# Patient Record
Sex: FEMALE | Race: WHITE | Employment: FULL TIME | URBAN - METROPOLITAN AREA
[De-identification: names, ages, dates, MRNs, and addresses within clinical notes are randomized per-mention and may not be internally consistent; named-entity substitution may affect disease eponyms.]

---

## 2019-06-26 ENCOUNTER — HOSPITAL ENCOUNTER (EMERGENCY)
Age: 39
Discharge: HOME | End: 2019-06-26
Attending: EMERGENCY MEDICINE
Payer: COMMERCIAL

## 2019-06-26 VITALS
HEIGHT: 67 IN | HEART RATE: 86 BPM | DIASTOLIC BLOOD PRESSURE: 85 MMHG | SYSTOLIC BLOOD PRESSURE: 128 MMHG | TEMPERATURE: 98.9 F | OXYGEN SATURATION: 100 % | BODY MASS INDEX: 19.78 KG/M2 | RESPIRATION RATE: 14 BRPM | WEIGHT: 126 LBS

## 2019-06-26 DIAGNOSIS — S61.012A LACERATION OF LEFT THUMB WITHOUT FOREIGN BODY, NAIL DAMAGE STATUS UNSPECIFIED, INITIAL ENCOUNTER: Primary | ICD-10-CM

## 2019-06-26 PROCEDURE — 75810000293 HC SIMP/SUPERF WND  RPR

## 2019-06-26 PROCEDURE — 99282 EMERGENCY DEPT VISIT SF MDM: CPT

## 2019-06-26 PROCEDURE — 77030002986 HC SUT PROL J&J -A

## 2019-06-26 RX ORDER — PROPRANOLOL HYDROCHLORIDE 10 MG/1
TABLET ORAL 3 TIMES DAILY
COMMUNITY
End: 2020-04-30

## 2019-06-26 RX ORDER — DICYCLOMINE HYDROCHLORIDE 10 MG/1
10 CAPSULE ORAL
COMMUNITY
End: 2020-04-30

## 2019-06-26 RX ORDER — PROMETHAZINE HYDROCHLORIDE 12.5 MG/1
TABLET ORAL
COMMUNITY
End: 2020-04-30

## 2019-06-26 RX ORDER — TRAMADOL HYDROCHLORIDE 50 MG/1
50 TABLET ORAL
COMMUNITY
End: 2020-04-30

## 2019-06-26 NOTE — DISCHARGE INSTRUCTIONS

## 2019-06-26 NOTE — ED PROVIDER NOTES
EMERGENCY DEPARTMENT HISTORY AND PHYSICAL EXAM    Date: 6/26/2019  Patient Name: Liya Quintanilla    History of Presenting Illness     Chief Complaint   Patient presents with    Laceration         History Provided By: Patient      Liya Quintanilla is a 43 y.o. female who presents to the emergency department C/O left thumb laceration. Patient notes that she was cutting lettuce for lunch when the knife slipped and she cut her left thumb approximately 2 cm. Blood was controlled at the scene no other injuries patient has no other complaints at this time her tetanus is up-to-date. PCP: Torin Ladd MD        Past History     Past Medical History:  History reviewed. No pertinent past medical history. Past Surgical History:  History reviewed. No pertinent surgical history. Family History:  History reviewed. No pertinent family history. Social History:  Social History     Tobacco Use    Smoking status: Never Smoker    Smokeless tobacco: Never Used   Substance Use Topics    Alcohol use: Not Currently     Frequency: Never    Drug use: Not on file       Allergies: Allergies   Allergen Reactions    Latex Rash    Chlorhexidine Gluconate Anaphylaxis         Review of Systems   Review of Systems   All other systems reviewed and are negative. Physical Exam     Vitals:    06/26/19 0657   BP: 128/85   Pulse: 86   Resp: 14   Temp: 98.9 °F (37.2 °C)   SpO2: 100%   Weight: 57.2 kg (126 lb)   Height: 5' 7\" (1.702 m)     Physical Exam    Nursing notes and vital signs reviewed    Constitutional: Non toxic appearing, no acute distress  Head: Normocephalic, Atraumatic  Eyes: no exudate  Neck: Supple  Cardiovascular: Regular rate and rhythm  Chest: Normal work of breathing and chest excursion bilaterally  Abdomen: non distended  Back: No evidence of trauma or deformity  Extremities: 2 cm angled laceration of the left thumb. Bleeding controlled.   Skin: Warm and dry, normal cap refill  Neuro: Alert and appropriate  Psychiatric: Normal mood and affect      Diagnostic Study Results     Labs -   No results found for this or any previous visit (from the past 12 hour(s)). Radiologic Studies -   No orders to display     CT Results  (Last 48 hours)    None        CXR Results  (Last 48 hours)    None          Medications given in the ED-  Medications - No data to display      Medical Decision Making   I am the first provider for this patient. I reviewed the vital signs, available nursing notes, past medical history, past surgical history, family history and social history. Vital Signs-Reviewed the patient's vital signs. Records Reviewed: Nursing Notes    Provider Notes (Medical Decision Making): Nicola Acosta is a 43 y.o. female who presented today with a left thumb laceration. Verbal consent was obtained. The laceration was irrigated and sutured. Patient tolerated it well. Patient will be discharged home hemodynamically stable to follow-up with PCM in 7 days for suture removal.  Her questions were answered she was in agreement with the plan    Procedures:  Wound Repair  Date/Time: 6/26/2019 7:39 AM  Performed by: attendingPreparation: sterile field established  Pre-procedure re-eval: Immediately prior to the procedure, the patient was reevaluated and found suitable for the planned procedure and any planned medications. Time out: Immediately prior to the procedure a time out was called to verify the correct patient, procedure, equipment, staff and marking as appropriate. .  Location details: left hand  Wound length:2.5 cm or less  Anesthesia: digital block and local infiltration    Anesthesia:  Local Anesthetic: lidocaine 1% without epinephrine  Foreign bodies: no foreign bodies  Irrigation solution: saline  Debridement: none  Skin closure: Prolene  Technique: simple  Dressing: 4x4  Patient tolerance: Patient tolerated the procedure well with no immediate complications  My total time at bedside, performing this procedure was 1-15 minutes. Diagnosis and Disposition       DISCHARGE NOTE:    Leana Evans  results have been reviewed with her. She has been counseled regarding her diagnosis, treatment, and plan. She verbally conveys understanding and agreement of the signs, symptoms, diagnosis, treatment and prognosis and additionally agrees to follow up as discussed. She also agrees with the care-plan and conveys that all of her questions have been answered. I have also provided discharge instructions for her that include: educational information regarding their diagnosis and treatment, and list of reasons why they would want to return to the ED prior to their follow-up appointment, should her condition change. She has been provided with education for proper emergency department utilization. CLINICAL IMPRESSION:    1. Laceration of left thumb without foreign body, nail damage status unspecified, initial encounter        PLAN:  1. D/C Home  2. Current Discharge Medication List        3. Follow-up Information     Follow up With Specialties Details Why Contact Info    Jose Eduardo Hancock MD [facility] Schedule an appointment as soon as possible for a visit in 1 week For suture removal [address]  [phone]          _______________________________      Please note that this dictation was completed with [employer], the computer voice recognition software. Quite often unanticipated grammatical, syntax, homophones, and other interpretive errors are inadvertently transcribed by the computer software. Please disregard these errors. Please excuse any errors that have escaped final proofreading.

## 2019-06-26 NOTE — ED TRIAGE NOTES
Pt arrives ambulatory to ED with c\o lac to left thumb, pt is able to make needs known speaking in complete sentences, pt in nad at this time, bleeding controlled in triage

## 2020-04-29 PROCEDURE — 75810000293 HC SIMP/SUPERF WND  RPR

## 2020-04-29 PROCEDURE — 99285 EMERGENCY DEPT VISIT HI MDM: CPT

## 2020-04-30 ENCOUNTER — HOSPITAL ENCOUNTER (EMERGENCY)
Age: 40
Discharge: HOME | End: 2020-04-30
Attending: EMERGENCY MEDICINE
Payer: COMMERCIAL

## 2020-04-30 ENCOUNTER — APPOINTMENT (OUTPATIENT)
Dept: GENERAL RADIOLOGY | Age: 40
End: 2020-04-30
Attending: EMERGENCY MEDICINE
Payer: COMMERCIAL

## 2020-04-30 ENCOUNTER — HOSPITAL ENCOUNTER (EMERGENCY)
Dept: CT IMAGING | Age: 40
Discharge: HOME | End: 2020-04-30
Attending: EMERGENCY MEDICINE
Payer: COMMERCIAL

## 2020-04-30 VITALS
HEART RATE: 98 BPM | HEIGHT: 67 IN | DIASTOLIC BLOOD PRESSURE: 88 MMHG | BODY MASS INDEX: 20.72 KG/M2 | OXYGEN SATURATION: 100 % | SYSTOLIC BLOOD PRESSURE: 101 MMHG | RESPIRATION RATE: 20 BRPM | WEIGHT: 132 LBS | TEMPERATURE: 98.3 F

## 2020-04-30 DIAGNOSIS — S01.111A EYEBROW LACERATION, RIGHT, INITIAL ENCOUNTER: Primary | ICD-10-CM

## 2020-04-30 DIAGNOSIS — S43.004A SHOULDER SEPARATION, RIGHT, INITIAL ENCOUNTER: ICD-10-CM

## 2020-04-30 DIAGNOSIS — S09.90XA MINOR HEAD INJURY, INITIAL ENCOUNTER: ICD-10-CM

## 2020-04-30 PROCEDURE — 74011250637 HC RX REV CODE- 250/637: Performed by: EMERGENCY MEDICINE

## 2020-04-30 PROCEDURE — 70486 CT MAXILLOFACIAL W/O DYE: CPT

## 2020-04-30 PROCEDURE — 70450 CT HEAD/BRAIN W/O DYE: CPT

## 2020-04-30 PROCEDURE — 77030002986 HC SUT PROL J&J -A

## 2020-04-30 PROCEDURE — 74011250636 HC RX REV CODE- 250/636: Performed by: EMERGENCY MEDICINE

## 2020-04-30 PROCEDURE — 73030 X-RAY EXAM OF SHOULDER: CPT

## 2020-04-30 PROCEDURE — 71101 X-RAY EXAM UNILAT RIBS/CHEST: CPT

## 2020-04-30 PROCEDURE — 72125 CT NECK SPINE W/O DYE: CPT

## 2020-04-30 PROCEDURE — 74011000250 HC RX REV CODE- 250: Performed by: EMERGENCY MEDICINE

## 2020-04-30 RX ORDER — OXYCODONE AND ACETAMINOPHEN 5; 325 MG/1; MG/1
1 TABLET ORAL
Qty: 20 TAB | Refills: 0 | Status: SHIPPED | OUTPATIENT
Start: 2020-04-30 | End: 2020-05-07

## 2020-04-30 RX ORDER — OXYCODONE AND ACETAMINOPHEN 5; 325 MG/1; MG/1
1 TABLET ORAL
Status: COMPLETED | OUTPATIENT
Start: 2020-04-30 | End: 2020-04-30

## 2020-04-30 RX ORDER — LIDOCAINE HYDROCHLORIDE AND EPINEPHRINE 10; 10 MG/ML; UG/ML
10 INJECTION, SOLUTION INFILTRATION; PERINEURAL ONCE
Status: COMPLETED | OUTPATIENT
Start: 2020-04-30 | End: 2020-04-30

## 2020-04-30 RX ORDER — PROMETHAZINE HYDROCHLORIDE 25 MG/1
25 TABLET ORAL
Status: COMPLETED | OUTPATIENT
Start: 2020-04-30 | End: 2020-04-30

## 2020-04-30 RX ORDER — GABAPENTIN 600 MG/1
TABLET ORAL
COMMUNITY

## 2020-04-30 RX ORDER — HYDROMORPHONE HYDROCHLORIDE 2 MG/1
2 TABLET ORAL
Status: COMPLETED | OUTPATIENT
Start: 2020-04-30 | End: 2020-04-30

## 2020-04-30 RX ORDER — PROPRANOLOL HYDROCHLORIDE 120 MG/1
120 CAPSULE, EXTENDED RELEASE ORAL DAILY
COMMUNITY

## 2020-04-30 RX ORDER — HYDROMORPHONE HYDROCHLORIDE 2 MG/1
2 TABLET ORAL
COMMUNITY
End: 2020-04-30

## 2020-04-30 RX ORDER — PROMETHAZINE HYDROCHLORIDE 25 MG/1
TABLET ORAL
COMMUNITY
Start: 2017-04-25

## 2020-04-30 RX ORDER — TRAMADOL HYDROCHLORIDE 50 MG/1
1 TABLET ORAL
COMMUNITY
Start: 2007-12-11 | End: 2020-04-30

## 2020-04-30 RX ORDER — FOLIC ACID 1 MG/1
TABLET ORAL DAILY
COMMUNITY

## 2020-04-30 RX ORDER — METHOTREXATE 2.5 MG/1
7.5 TABLET ORAL EVERY OTHER DAY
COMMUNITY

## 2020-04-30 RX ORDER — FENTANYL CITRATE 50 UG/ML
50 INJECTION, SOLUTION INTRAMUSCULAR; INTRAVENOUS ONCE
Status: DISCONTINUED | OUTPATIENT
Start: 2020-04-30 | End: 2020-04-30

## 2020-04-30 RX ORDER — NALOXONE HYDROCHLORIDE 4 MG/.1ML
SPRAY NASAL AS NEEDED
COMMUNITY
Start: 2017-04-02

## 2020-04-30 RX ORDER — ACETAMINOPHEN 325 MG/1
650 TABLET ORAL DAILY
COMMUNITY

## 2020-04-30 RX ORDER — DICYCLOMINE HYDROCHLORIDE 20 MG/1
20 TABLET ORAL 2 TIMES DAILY
COMMUNITY
Start: 2017-09-20

## 2020-04-30 RX ORDER — FENTANYL CITRATE 50 UG/ML
50 INJECTION, SOLUTION INTRAMUSCULAR; INTRAVENOUS
Status: COMPLETED | OUTPATIENT
Start: 2020-04-30 | End: 2020-04-30

## 2020-04-30 RX ORDER — CARISOPRODOL 350 MG/1
350 TABLET ORAL
Qty: 20 TAB | Refills: 0 | Status: SHIPPED | OUTPATIENT
Start: 2020-04-30

## 2020-04-30 RX ORDER — ONDANSETRON 4 MG/1
4 TABLET, ORALLY DISINTEGRATING ORAL
Status: COMPLETED | OUTPATIENT
Start: 2020-04-30 | End: 2020-04-30

## 2020-04-30 RX ORDER — GABAPENTIN 300 MG/1
300 CAPSULE ORAL DAILY
COMMUNITY

## 2020-04-30 RX ADMIN — PROMETHAZINE HYDROCHLORIDE 25 MG: 25 TABLET ORAL at 03:07

## 2020-04-30 RX ADMIN — FENTANYL CITRATE 50 MCG: 50 INJECTION, SOLUTION INTRAMUSCULAR; INTRAVENOUS at 01:05

## 2020-04-30 RX ADMIN — HYDROMORPHONE HYDROCHLORIDE 2 MG: 2 TABLET ORAL at 03:07

## 2020-04-30 RX ADMIN — ONDANSETRON 4 MG: 4 TABLET, ORALLY DISINTEGRATING ORAL at 01:06

## 2020-04-30 RX ADMIN — OXYCODONE HYDROCHLORIDE AND ACETAMINOPHEN 1 TABLET: 5; 325 TABLET ORAL at 01:06

## 2020-04-30 RX ADMIN — LIDOCAINE HYDROCHLORIDE,EPINEPHRINE BITARTRATE 100 MG: 10; .01 INJECTION, SOLUTION INFILTRATION; PERINEURAL at 04:24

## 2020-04-30 NOTE — ED NOTES
Shoulder immobilizer placed to right shoulder/arm. Pt raj well. Pink warm fingers able to move fingers. Pt states feels better. Dr. Wilfred Lewis has repaired forehead lac. Pt raj well. Pt discharged home. Reviewed d/c teaching with pt including reasons to return, f/u care, and home care. Pt verbalizes understanding. Paper copies of d/c teaching given. Scripts provided and instructed. Instructed regarding soma and percocet possible sedation no driving, operating machinery, or drinking etoh with use. Instructed regarding max dose of acet/tyl per day and amt in percocet. Pt verbalizes understanding. Ambulatory with steady gait.  has come to take her home.    Patient armband removed and shredded

## 2020-04-30 NOTE — DISCHARGE INSTRUCTIONS
Patient Education        Sutures out in 7 days  Patient Education        Learning About a Closed Head Injury  What is a closed head injury? A closed head injury happens when your head gets hit hard. The strong force of the blow causes your brain to shake in your skull. This movement can cause the brain to bruise, swell, or tear. Sometimes nerves or blood vessels also get damaged. This can cause bleeding in or around the brain. A concussion is a type of closed head injury. What are the symptoms? If you have a mild concussion, you may have a mild headache or feel \"not quite right. \" These symptoms are common. They usually go away over a few days to 4 weeks. But sometimes after a concussion, you feel like you can't function as well as before the injury. And you have new symptoms. This is called postconcussive syndrome. You may:  · Find it harder to solve problems, think, concentrate, or remember. · Have headaches. · Have changes in your sleep patterns, such as not being able to sleep or sleeping all the time. · Have changes in your personality. · Not be interested in your usual activities. · Feel angry or anxious without a clear reason. · Lose your sense of taste or smell. · Be dizzy, lightheaded, or unsteady. It may be hard to stand or walk. How is a closed head injury treated? Any person who may have a concussion needs to see a doctor. Some people have to stay in the hospital to be watched. Others can go home safely. If you go home, follow your doctor's instructions. He or she will tell you if you need someone to watch you closely for the next 24 hours or longer. Rest is the best treatment. Get plenty of sleep at night. And try to rest during the day. · Avoid activities that are physically or mentally demanding. These include housework, exercise, and schoolwork. And don't play video games, send text messages, or use the computer.  You may need to change your school or work schedule to be able to avoid these activities. · Ask your doctor when it's okay to drive, ride a bike, or operate machinery. · Take an over-the-counter pain medicine, such as acetaminophen (Tylenol), ibuprofen (Advil, Motrin), or naproxen (Aleve). Be safe with medicines. Read and follow all instructions on the label. · Check with your doctor before you use any other medicines for pain. · Do not drink alcohol or use illegal drugs. They can slow recovery. They can also increase your risk of getting a second head injury. Follow-up care is a key part of your treatment and safety. Be sure to make and go to all appointments, and call your doctor if you are having problems. It's also a good idea to know your test results and keep a list of the medicines you take. Where can you learn more? Go to http://conyExplorer.ionelly.info/  Enter E235 in the search box to learn more about \"Learning About a Closed Head Injury. \"  Current as of: November 19, 2019Content Version: 12.4  © 9493-1539 [employer]. Care instructions adapted under license by [employer] (which disclaims liability or warranty for this information). If you have questions about a medical condition or this instruction, always ask your healthcare professional. [address] any warranty or liability for your use of this information. Patient Education        Shoulder Dislocation: Rehab Exercises  Introduction  Here are some examples of exercises for you to try. The exercises may be suggested for a condition or for rehabilitation. Start each exercise slowly. Ease off the exercises if you start to have pain. You will be told when to start these exercises and which ones will work best for you. How to do the exercises  Shoulder flexion (lying down)   1. Lie on your back, holding a wand with your hands. Your palms should face down as you hold the wand. Place your hands slightly wider than your shoulders.   2. Keeping your elbows straight, slowly raise your arms over your head until you feel a stretch in your shoulders, upper back, and chest.  3. Hold 15 to 30 seconds. 4. Repeat 2 to 4 times. Shoulder blade squeeze   1. While standing with your arms at your sides, squeeze your shoulder blades together. Do not raise your shoulders as you are squeezing. 2. Hold for 6 seconds. 3. Repeat 8 to 12 times. Internal rotator strengthening exercise   1. Begin by tying a piece of elastic exercise material to a doorknob. 2. Stand or sit with your shoulder relaxed and your elbow bent 90 degrees (like the angle of the letter \"L\"). Your upper arm should rest comfortably against your side. Squeeze a rolled towel between your elbow and your body for comfort and to help keep your arm at your side. 3. Hold one end of the elastic band in the hand of the painful arm. 4. Rotate your forearm toward your body until it touches your belly. 5. Keep your elbow and upper arm firmly tucked against the towel roll or the side of your body during this movement. 6. Repeat 8 to 12 times. Isometric shoulder external rotation   1. Stand with your affected arm close to a wall. 2. Bend your arm up so your elbow is at a 90 degree angle (like the letter \"L\"), and turn your palm as if you are about to shake someone's hand. 3. Hold your forearm and elbow close to the wall. Press the back of your hand into the wall with moderate pressure. 4. Hold for a count of 6.  5. Repeat 8 to 12 times. Isometric shoulder abduction   1. Stand with your affected arm close to a wall. 2. Bend your arm up so your elbow is at a 90 degree angle (like the letter \"L\"), and turn your palm as if you are about to shake someone's hand. 3. Hold your forearm and elbow close to the wall. Press your elbow into the wall with moderate pressure. 4. Hold for a count of 6.  5. Repeat 8 to 12 times. Wall push-ups   1.  Stand against a wall with your feet about 12 to 24 inches away from the wall. If you feel any pain when you do this exercise, stand closer to the wall. 2. Place your hands on the wall slightly wider apart than your shoulders, and lean forward. 3. Gently lean your body toward the wall. Then push back to your starting position. Keep the motion smooth and controlled. 4. Repeat 8 to 12 times. Follow-up care is a key part of your treatment and safety. Be sure to make and go to all appointments, and call your doctor if you are having problems. It's also a good idea to know your test results and keep a list of the medicines you take. Where can you learn more? Go to http://cony-nelly.info/  Enter V550 in the search box to learn more about \"Shoulder Dislocation: Rehab Exercises. \"  Current as of: June 26, 2019Content Version: 12.4  © 1969-7866 [employer]. Care instructions adapted under license by [employer] (which disclaims liability or warranty for this information). If you have questions about a medical condition or this instruction, always ask your healthcare professional. [address] any warranty or liability for your use of this information. Patient Education        The Shoulder Joint: Anatomy Sketch    Current as of: June 26, 2019Content Version: 12.4  © 6857-8908 [employer]. Care instructions adapted under license by [employer] (which disclaims liability or warranty for this information). If you have questions about a medical condition or this instruction, always ask your healthcare professional. [address] any warranty or liability for your use of this information. Cuts on the Face Closed With Stitches: Care Instructions  Your Care Instructions  A cut on your face can be on your chin, cheek, nose, forehead, eyelid, lip, or ear. The doctor used stitches to close the cut. Using stitches helps the cut heal and reduces scarring.  The doctor may also have called in a specialist, such as a plastic surgeon, to close the cut. If the cut went deep and through the skin, the doctor may have put in two layers of stitches. The deeper layer brings the deep part of the cut together. These stitches will dissolve and don't need to be removed. The stitches in the upper layer are the ones you see on the cut. You will probably have a bandage. You will need to have the stitches removed, usually in 3 to 5 days. The doctor has checked you carefully, but problems can develop later. If you notice any problems or new symptoms, get medical treatment right away. Follow-up care is a key part of your treatment and safety. Be sure to make and go to all appointments, and call your doctor if you are having problems. It's also a good idea to know your test results and keep a list of the medicines you take. How can you care for yourself at home? · Keep the cut dry for the first 24 to 48 hours. After this, you can shower if your doctor okays it. Pat the cut dry. · Don't soak the cut, such as in a bathtub. Your doctor will tell you when it's safe to get the cut wet. · If your doctor told you how to care for your cut, follow your doctor's instructions. If you did not get instructions, follow this general advice:  ? After the first 24 to 48 hours, wash around the cut with clean water 2 times a day. Don't use hydrogen peroxide or alcohol, which can slow healing. ? You may cover the cut with a thin layer of petroleum jelly, such as Vaseline, and a nonstick bandage. ? Apply more petroleum jelly and replace the bandage as needed. · Avoid any activity that could cause your cut to reopen. · Do not remove the stitches on your own. Your doctor will tell you when to come back to have the stitches removed. · Be safe with medicines. Take pain medicines exactly as directed. ? If the doctor gave you a prescription medicine for pain, take it as prescribed.   ? If you are not taking a prescription pain medicine, ask your doctor if you can take an over-the-counter medicine. When should you call for help? Call your doctor now or seek immediate medical care if:    · You have new pain, or your pain gets worse.     · The skin near the cut is cold or pale or changes color.     · You have tingling, weakness, or numbness near the cut.     · The cut starts to bleed, and blood soaks through the bandage. Oozing small amounts of blood is normal.     · You have symptoms of infection, such as:  ? Increased pain, swelling, warmth, or redness around the cut.  ? Red streaks leading from the cut.  ? Pus draining from the cut.  ? A fever.    Watch closely for changes in your health, and be sure to contact your doctor if:    · You do not get better as expected. Where can you learn more? Go to http://cony-nelly.info/  Enter K864 in the search box to learn more about \"Cuts on the Face Closed With Stitches: Care Instructions. \"  Current as of: June 26, 2019Content Version: 12.4  © 9020-8145 [employer]. Care instructions adapted under license by [employer] (which disclaims liability or warranty for this information). If you have questions about a medical condition or this instruction, always ask your healthcare professional. [address] any warranty or liability for your use of this information.

## 2020-04-30 NOTE — ED TRIAGE NOTES
Pt presents to ED from home after accidentally falling down full flight of stairs injuring face and right shoulder. Pt thinks she fell face first at least partially down. Pt has 3 cm lac to right eyebrow, swelling and bruising to right cheek. 6-7cm abrasion to lateral, posterior right shoulder.   Pt is A&Ox4, resps E&U

## 2020-05-03 NOTE — ED PROVIDER NOTES
EMERGENCY DEPARTMENT HISTORY AND PHYSICAL EXAM    Date: 4/30/2020  Patient Name: Willian Patton    History of Presenting Illness     Chief Complaint   Patient presents with    Fall         History Provided By: Patient    Additional History (Context):   1:13 AM  Willian Patton is a 35 y.o. female with PMHX of Lupus, fibromyalgia, migraines who presents to the emergency department C/O fall and injury. She got up from bed to go to the restroom, turned by the steps and suffered a mechanical fall, striking her head, face and right shoulder. Uncertain but likely pos LOC though only for a few moments. No visual changes no CP or SOB, no N/V. She has a cut over the right eye , tetanus is UTD    Social History  Denies smoking, drinking or drugs    Family History  Negative       PCP: Pippa Lake MD    Current Outpatient Medications   Medication Sig Dispense Refill    naloxone (Narcan) 4 mg/actuation nasal spray as needed.  dicyclomine (BENTYL) 20 mg tablet Take 20 mg by mouth two (2) times a day.  promethazine (PHENERGAN) 25 mg tablet 1-2 TABS PO PRN      propranolol LA (Inderal LA) 120 mg SR capsule Take 120 mg by mouth daily.  gabapentin (NEURONTIN) 300 mg capsule Take 300 mg by mouth daily.  gabapentin (NEURONTIN) 600 mg tablet Take  by mouth nightly.  methotrexate (RHEUMATREX) 2.5 mg tablet Take 7.5 mg by mouth every other day. Sunday and Wednesday      folic acid (FOLVITE) 1 mg tablet Take  by mouth daily.  oxyCODONE-acetaminophen (Percocet) 5-325 mg per tablet Take 1 Tab by mouth every four (4) hours as needed for Pain for up to 7 days. Max Daily Amount: 6 Tabs. Take 1 tablet every 4-6 hours as needed for pain control. If you were instructed to try over the counter ibuprofen or tylenol, only take the percocet for pain not controlled with the over the counter medication.  20 Tab 0    carisoprodoL (Soma) 350 mg tablet Take 1 Tab by mouth every eight (8) hours as needed for Muscle Spasm(s). Max Daily Amount: 1,050 mg. 20 Tab 0    diphenhydramine HCl (BENADRYL ALLERGY PO) Take  by mouth as needed.  LACTOBACILLUS ACIDOPHILUS PO Take  by mouth daily.  acetaminophen (TYLENOL) 325 mg tablet Take 650 mg by mouth daily. Past History     Past Medical History:  Past Medical History:   Diagnosis Date    Fibromyalgia     Lupus ([address]     Migraines     Polyarthropathy        Past Surgical History:  Past Surgical History:   Procedure Laterality Date    HX CHOLECYSTECTOMY         Family History:  No family history on file. Social History:  Social History     Tobacco Use    Smoking status: Former Smoker    Smokeless tobacco: Never Used   Substance Use Topics    Alcohol use: Not Currently     Frequency: Never    Drug use: Never       Allergies: Allergies   Allergen Reactions    Latex Rash    Chlorhexidine Gluconate Anaphylaxis    Acrylic Acid Itching     Acrylic glue    Clindamycin Rash         Review of Systems   Review of Systems   Musculoskeletal: Positive for arthralgias and neck pain. Neurological: Positive for light-headedness and headaches. Negative for tremors, syncope, weakness and numbness. All other systems reviewed and are negative. Physical Exam     Vitals:    04/30/20 0330 04/30/20 0345 04/30/20 0400 04/30/20 0415   BP: 109/71 111/71 106/78 101/88   Pulse:       Resp:       Temp:       SpO2: 100% 100% 99% 100%   Weight:       Height:         Physical Exam  Vitals signs and nursing note reviewed. Constitutional:       General: She is not in acute distress. Appearance: She is well-developed. She is not diaphoretic. HENT:      Head: Normocephalic. Abrasion, right periorbital erythema and laceration present. No Vivas's sign. Right Ear: Tympanic membrane and external ear normal.      Left Ear: Tympanic membrane and external ear normal.      Mouth/Throat:      Pharynx: No oropharyngeal exudate.    Eyes:      General: No visual field deficit or scleral icterus. Conjunctiva/sclera: Conjunctivae normal.      Pupils: Pupils are equal, round, and reactive to light. Comments: No pallor   Neck:      Musculoskeletal: Normal range of motion and neck supple. Thyroid: No thyromegaly. Vascular: No JVD. Trachea: No tracheal deviation. Cardiovascular:      Rate and Rhythm: Normal rate and regular rhythm. Heart sounds: Normal heart sounds. Pulmonary:      Effort: Pulmonary effort is normal. No respiratory distress. Breath sounds: Normal breath sounds. No stridor. Abdominal:      General: Bowel sounds are normal. There is no distension. Palpations: Abdomen is soft. Tenderness: There is no abdominal tenderness. There is no guarding or rebound. Musculoskeletal:      Right shoulder: She exhibits decreased range of motion, tenderness, bony tenderness, swelling and pain. She exhibits no effusion, no crepitus, no deformity, no laceration, no spasm, normal pulse and normal strength. Arms:       Comments: Distal radial median and ulnar nerves are normal    No other soft tissue injuries   Lymphadenopathy:      Cervical: No cervical adenopathy. Skin:     General: Skin is warm and dry. Findings: Abrasion, bruising, ecchymosis, signs of injury and wound present. No erythema or rash. Comments: 5 cm lac thru subcutaneous tissue, the right supraorbital ridge  Abrasion to the right zygoma  [facility]   Neurological:      Mental Status: She is alert and oriented to person, place, and time. GCS: GCS eye subscore is 4. GCS verbal subscore is 5. GCS motor subscore is 6. Cranial Nerves: No cranial nerve deficit or dysarthria. Motor: Motor function is intact. Coordination: Coordination normal.      Deep Tendon Reflexes: Reflexes are normal and symmetric.  Reflexes normal.      Comments: Subtle sensory changes to the right V1 distribution, otherwise normal   Psychiatric: Attention and Perception: Attention normal.         Mood and Affect: Mood normal.         Speech: Speech normal.         Behavior: Behavior normal.         Thought Content: Thought content normal.         Judgment: Judgment normal.      Comments: Quite calm, even joking           Diagnostic Study Results     Labs -   No results found for this or any previous visit (from the past 12 hour(s)). Radiologic Studies -   XR SHOULDER RT AP/LAT MIN 2 V   Final Result   Impression:   --------------      Elevation of the clavicular end of the acromioclavicular joint relative to the   acromial end. Suspect a grade 2 acromioclavicular separation. No fracture lines are seen. XR RIBS RT W PA CXR MIN 3 V   Final Result   Impression:   --------------      No significant abnormalities. CT HEAD WO CONT   Final Result   IMPRESSION:      No acute intracranial abnormalities. CT SPINE CERV WO CONT   Final Result   IMPRESSION: Moderate C5-6 disc degenerative change with a prominent posterior   osteophyte and associated mild uncovertebral hypertrophy with mild to moderate   spinal canal and neuroforaminal narrowing. Straightening of the cervical spine curvature of uncertain significance. No fracture seen. CT MAXILLOFACIAL WO CONT   Final Result   Impression:   --------------      No acute osseous abnormality. Right periorbital soft tissue swelling with a lateral right periorbital   laceration.               CT Results  (Last 48 hours)    None        CXR Results  (Last 48 hours)    None          Medications given in the ED-  Medications   oxyCODONE-acetaminophen (PERCOCET) 5-325 mg per tablet 1 Tab (1 Tab Oral Given 4/30/20 0106)   ondansetron (ZOFRAN ODT) tablet 4 mg (4 mg Oral Given 4/30/20 0106)   lidocaine-EPINEPHrine (XYLOCAINE) 1 %-1:100,000 injection 100 mg (100 mg IntraDERMal Given by Provider 4/30/20 3466)   fentaNYL citrate (PF) injection 50 mcg (50 mcg IntraNASal Given 4/30/20 0105) HYDROmorphone (DILAUDID) tablet 2 mg (2 mg Oral Given 4/30/20 0307)   promethazine (PHENERGAN) tablet 25 mg (25 mg Oral Given 4/30/20 0307)         Medical Decision Making   I am the first provider for this patient. I reviewed the vital signs, available nursing notes, past medical history, past surgical history, family history and social history. Vital Signs-Reviewed the patient's vital signs. Pulse Oximetry Analysis - 100% on room air     Cardiac Monitor:  Rate: 94 bpm  Rhythm: sinus rhythm      Records Reviewed: NURSING NOTES AND PREVIOUS MEDICAL RECORDS    Provider Notes (Medical Decision Making):   Mechanical fall with head, face neck shoulder injuries. The rest of exam is normal despite lupus and fibromyalgia, though perhaps why pain was a bit tough to control. Wound cleaned and closed in layers. Shoulder immobilized, sutures out in 7 days    Procedures:  WOUND REPAIR  Date/Time: 4/30/2020 3:45 AM  Performed by: attendingPreparation: skin prepped with Shur-Clens  Pre-procedure re-eval: Immediately prior to the procedure, the patient was reevaluated and found suitable for the planned procedure and any planned medications. Time out: Immediately prior to the procedure a time out was called to verify the correct patient, procedure, equipment, staff and marking as appropriate. .  Location details: face  Wound length:2.6 - 7.5 cm  Anesthesia: local infiltration    Anesthesia:  Local Anesthetic: lidocaine 1% with epinephrine  Anesthetic total: 10 mL  Sedatives: fentanyl  Foreign bodies: no foreign bodies  Irrigation solution: saline  Irrigation method: jet lavage  Debridement: none  Skin closure: 6-0 nylon  Subcutaneous closure: Vicryl  Number of sutures: 12  Technique: simple and interrupted (vicryl was inverted figure 8)  Approximation: close  Dressing: antibiotic ointment  My total time at bedside, performing this procedure was 31-45 minutes. ED Course:   1:13 AM: Initial assessment performed. The patients presenting problems have been discussed, and they are in agreement with the care plan formulated and outlined with them. I have encouraged them to ask questions as they arise throughout their visit. Diagnosis and Disposition       DISCHARGE NOTE:  4:30 AM  Jennifer Floyd  results have been reviewed with her. She has been counseled regarding her diagnosis, treatment, and plan. She verbally conveys understanding and agreement of the signs, symptoms, diagnosis, treatment and prognosis and additionally agrees to follow up as discussed. She also agrees with the care-plan and conveys that all of her questions have been answered. I have also provided discharge instructions for her that include: educational information regarding their diagnosis and treatment, and list of reasons why they would want to return to the ED prior to their follow-up appointment, should her condition change. She has been provided with education for proper emergency department utilization. CLINICAL IMPRESSION:    1. Eyebrow laceration, right, initial encounter    2. Shoulder separation, right, initial encounter    3. Minor head injury, initial encounter        PLAN:  1. D/C Home  2. Discharge Medication List as of 4/30/2020  4:30 AM      START taking these medications    Details   oxyCODONE-acetaminophen (Percocet) 5-325 mg per tablet Take 1 Tab by mouth every four (4) hours as needed for Pain for up to 7 days. Max Daily Amount: 6 Tabs. Take 1 tablet every 4-6 hours as needed for pain control. If you were instructed to try over the counter ibuprofen or tylenol, only take the percocet  for pain not controlled with the over the counter medication. , Print, Disp-20 Tab, R-0      carisoprodoL (Soma) 350 mg tablet Take 1 Tab by mouth every eight (8) hours as needed for Muscle Spasm(s).  Max Daily Amount: 1,050 mg., Print, Disp-20 Tab, R-0         CONTINUE these medications which have NOT CHANGED    Details   naloxone (Narcan) 4 mg/actuation nasal spray as needed., Historical Med      dicyclomine (BENTYL) 20 mg tablet Take 20 mg by mouth two (2) times a day., Historical Med      promethazine (PHENERGAN) 25 mg tablet 1-2 TABS PO PRN, Historical Med      propranolol LA (Inderal LA) 120 mg SR capsule Take 120 mg by mouth daily. , Historical Med      gabapentin (NEURONTIN) 300 mg capsule Take 300 mg by mouth daily. , Historical Med      gabapentin (NEURONTIN) 600 mg tablet Take  by mouth nightly., Historical Med      methotrexate (RHEUMATREX) 2.5 mg tablet Take 7.5 mg by mouth every other day. Sunday and Wednesday, Historical Med      folic acid (FOLVITE) 1 mg tablet Take  by mouth daily. , Historical Med      diphenhydramine HCl (BENADRYL ALLERGY PO) Take  by mouth as needed., Historical Med      LACTOBACILLUS ACIDOPHILUS PO Take  by mouth daily. , Historical Med      acetaminophen (TYLENOL) 325 mg tablet Take 650 mg by mouth daily. , Historical Med         STOP taking these medications       traMADoL (Ultram) 50 mg tablet Comments:   Reason for Stopping:         HYDROmorphone (Dilaudid) 2 mg tablet Comments:   Reason for Stopping:             3.   Follow-up Information     Follow up With Specialties Details Why Contact Shilpa Jaramillo MD Orthopedic Surgery In 1 week  [address] [place] [address] Rd  [address]      THE [facility] EMERGENCY DEPT Emergency Medicine  As needed [address] [id number]  [phone]        _______________________________    This note was partially transcribed via voice recognition software. Although efforts have been made to catch any discrepancies, it may contain sound alike words, grammatical errors, or nonsensical words.

## 2021-09-30 ENCOUNTER — APPOINTMENT (OUTPATIENT)
Dept: CT IMAGING | Age: 41
End: 2021-09-30
Attending: EMERGENCY MEDICINE
Payer: COMMERCIAL

## 2021-09-30 ENCOUNTER — HOSPITAL ENCOUNTER (EMERGENCY)
Age: 41
Discharge: HOME | End: 2021-09-30
Attending: EMERGENCY MEDICINE
Payer: COMMERCIAL

## 2021-09-30 VITALS
TEMPERATURE: 98.6 F | DIASTOLIC BLOOD PRESSURE: 83 MMHG | SYSTOLIC BLOOD PRESSURE: 120 MMHG | BODY MASS INDEX: 19.93 KG/M2 | OXYGEN SATURATION: 100 % | HEIGHT: 67 IN | RESPIRATION RATE: 18 BRPM | HEART RATE: 65 BPM | WEIGHT: 127 LBS

## 2021-09-30 DIAGNOSIS — N30.00 ACUTE CYSTITIS WITHOUT HEMATURIA: ICD-10-CM

## 2021-09-30 DIAGNOSIS — R10.84 ABDOMINAL PAIN, GENERALIZED: Primary | ICD-10-CM

## 2021-09-30 LAB
ALBUMIN SERPL-MCNC: 3.8 G/DL (ref 3.4–5)
ALBUMIN/GLOB SERPL: 1 {RATIO} (ref 0.8–1.7)
ALP SERPL-CCNC: 106 U/L (ref 45–117)
ALT SERPL-CCNC: 25 U/L (ref 13–56)
ANION GAP SERPL CALC-SCNC: 3 MMOL/L (ref 3–18)
APPEARANCE UR: CLEAR
AST SERPL-CCNC: 19 U/L (ref 10–38)
BACTERIA URNS QL MICRO: ABNORMAL /HPF
BASOPHILS # BLD: 0 K/UL (ref 0–0.1)
BASOPHILS NFR BLD: 1 % (ref 0–2)
BILIRUB SERPL-MCNC: 0.4 MG/DL (ref 0.2–1)
BILIRUB UR QL: NEGATIVE
BUN SERPL-MCNC: 15 MG/DL (ref 7–18)
BUN/CREAT SERPL: 16 (ref 12–20)
CALCIUM SERPL-MCNC: 9 MG/DL (ref 8.5–10.1)
CHLORIDE SERPL-SCNC: 107 MMOL/L (ref 100–111)
CO2 SERPL-SCNC: 31 MMOL/L (ref 21–32)
COLOR UR: YELLOW
CREAT SERPL-MCNC: 0.93 MG/DL (ref 0.6–1.3)
DIFFERENTIAL METHOD BLD: ABNORMAL
EOSINOPHIL # BLD: 0.1 K/UL (ref 0–0.4)
EOSINOPHIL NFR BLD: 2 % (ref 0–5)
EPITH CASTS URNS QL MICRO: ABNORMAL /LPF (ref 0–5)
ERYTHROCYTE [DISTWIDTH] IN BLOOD BY AUTOMATED COUNT: 14 % (ref 11.6–14.5)
GLOBULIN SER CALC-MCNC: 3.9 G/DL (ref 2–4)
GLUCOSE SERPL-MCNC: 89 MG/DL (ref 74–99)
GLUCOSE UR STRIP.AUTO-MCNC: NEGATIVE MG/DL
HCG SERPL QL: NEGATIVE
HCT VFR BLD AUTO: 37.9 % (ref 35–45)
HGB BLD-MCNC: 12.1 G/DL (ref 12–16)
HGB UR QL STRIP: NEGATIVE
KETONES UR QL STRIP.AUTO: NEGATIVE MG/DL
LEUKOCYTE ESTERASE UR QL STRIP.AUTO: ABNORMAL
LIPASE SERPL-CCNC: 188 U/L (ref 73–393)
LYMPHOCYTES # BLD: 1.4 K/UL (ref 0.9–3.6)
LYMPHOCYTES NFR BLD: 21 % (ref 21–52)
MCH RBC QN AUTO: 32.5 PG (ref 24–34)
MCHC RBC AUTO-ENTMCNC: 31.9 G/DL (ref 31–37)
MCV RBC AUTO: 101.9 FL (ref 78–100)
MONOCYTES # BLD: 0.5 K/UL (ref 0.05–1.2)
MONOCYTES NFR BLD: 8 % (ref 3–10)
NEUTS SEG # BLD: 4.4 K/UL (ref 1.8–8)
NEUTS SEG NFR BLD: 68 % (ref 40–73)
NITRITE UR QL STRIP.AUTO: NEGATIVE
PH UR STRIP: >8.5 [PH] (ref 5–8)
PLATELET # BLD AUTO: 441 K/UL (ref 135–420)
PMV BLD AUTO: 9.8 FL (ref 9.2–11.8)
POTASSIUM SERPL-SCNC: 4.6 MMOL/L (ref 3.5–5.5)
PROT SERPL-MCNC: 7.7 G/DL (ref 6.4–8.2)
PROT UR STRIP-MCNC: NEGATIVE MG/DL
RBC # BLD AUTO: 3.72 M/UL (ref 4.2–5.3)
RBC #/AREA URNS HPF: ABNORMAL /HPF (ref 0–5)
SODIUM SERPL-SCNC: 141 MMOL/L (ref 136–145)
SP GR UR REFRACTOMETRY: 1.01 (ref 1–1.03)
UROBILINOGEN UR QL STRIP.AUTO: 0.2 EU/DL (ref 0.2–1)
WBC # BLD AUTO: 6.6 K/UL (ref 4.6–13.2)
WBC URNS QL MICRO: ABNORMAL /HPF (ref 0–5)

## 2021-09-30 PROCEDURE — 74011000636 HC RX REV CODE- 636: Performed by: EMERGENCY MEDICINE

## 2021-09-30 PROCEDURE — 96374 THER/PROPH/DIAG INJ IV PUSH: CPT

## 2021-09-30 PROCEDURE — 96375 TX/PRO/DX INJ NEW DRUG ADDON: CPT

## 2021-09-30 PROCEDURE — 74011250637 HC RX REV CODE- 250/637: Performed by: EMERGENCY MEDICINE

## 2021-09-30 PROCEDURE — 87086 URINE CULTURE/COLONY COUNT: CPT

## 2021-09-30 PROCEDURE — 84703 CHORIONIC GONADOTROPIN ASSAY: CPT

## 2021-09-30 PROCEDURE — 74177 CT ABD & PELVIS W/CONTRAST: CPT

## 2021-09-30 PROCEDURE — 83690 ASSAY OF LIPASE: CPT

## 2021-09-30 PROCEDURE — 96376 TX/PRO/DX INJ SAME DRUG ADON: CPT

## 2021-09-30 PROCEDURE — 85025 COMPLETE CBC W/AUTO DIFF WBC: CPT

## 2021-09-30 PROCEDURE — 80053 COMPREHEN METABOLIC PANEL: CPT

## 2021-09-30 PROCEDURE — 99285 EMERGENCY DEPT VISIT HI MDM: CPT

## 2021-09-30 PROCEDURE — 74011250636 HC RX REV CODE- 250/636: Performed by: EMERGENCY MEDICINE

## 2021-09-30 PROCEDURE — 81001 URINALYSIS AUTO W/SCOPE: CPT

## 2021-09-30 RX ORDER — FAMOTIDINE 10 MG/ML
20 INJECTION INTRAVENOUS
Status: COMPLETED | OUTPATIENT
Start: 2021-09-30 | End: 2021-09-30

## 2021-09-30 RX ORDER — ONDANSETRON 2 MG/ML
4 INJECTION INTRAMUSCULAR; INTRAVENOUS
Status: COMPLETED | OUTPATIENT
Start: 2021-09-30 | End: 2021-09-30

## 2021-09-30 RX ORDER — MORPHINE SULFATE 10 MG/ML
6 INJECTION, SOLUTION INTRAMUSCULAR; INTRAVENOUS
Status: COMPLETED | OUTPATIENT
Start: 2021-09-30 | End: 2021-09-30

## 2021-09-30 RX ORDER — FLUTICASONE PROPIONATE 50 MCG
2 SPRAY, SUSPENSION (ML) NASAL DAILY
Status: DISCONTINUED | OUTPATIENT
Start: 2021-09-30 | End: 2021-09-30 | Stop reason: HOSPADM

## 2021-09-30 RX ORDER — MORPHINE SULFATE 4 MG/ML
4 INJECTION INTRAVENOUS
Status: COMPLETED | OUTPATIENT
Start: 2021-09-30 | End: 2021-09-30

## 2021-09-30 RX ORDER — CEPHALEXIN 500 MG/1
500 CAPSULE ORAL 3 TIMES DAILY
Qty: 21 CAPSULE | Refills: 0 | Status: SHIPPED | OUTPATIENT
Start: 2021-09-30 | End: 2021-10-07

## 2021-09-30 RX ADMIN — MORPHINE SULFATE 6 MG: 10 INJECTION INTRAVENOUS at 08:31

## 2021-09-30 RX ADMIN — SODIUM CHLORIDE 1000 ML: 900 INJECTION, SOLUTION INTRAVENOUS at 08:31

## 2021-09-30 RX ADMIN — IOPAMIDOL 100 ML: 612 INJECTION, SOLUTION INTRAVENOUS at 09:57

## 2021-09-30 RX ADMIN — ONDANSETRON 4 MG: 2 INJECTION INTRAMUSCULAR; INTRAVENOUS at 08:31

## 2021-09-30 RX ADMIN — FAMOTIDINE 20 MG: 10 INJECTION INTRAVENOUS at 08:32

## 2021-09-30 RX ADMIN — MORPHINE SULFATE 4 MG: 4 INJECTION INTRAVENOUS at 10:57

## 2021-09-30 RX ADMIN — FLUTICASONE PROPIONATE 2 SPRAY: 50 SPRAY, METERED NASAL at 09:43

## 2021-09-30 NOTE — ED PROVIDER NOTES
EMERGENCY DEPARTMENT HISTORY AND PHYSICAL EXAM    Date: 9/30/2021  Patient Name: Jacoby Alba    History of Presenting Illness     Chief Complaint   Patient presents with    Abdominal Pain         History Provided By: Patient    8:02 AM  Jacoby Alba is a 42 y.o. female with PMHX of fibromyalgia, lupus, prior cholecystectomy, currently undergoing work-up with gastroenterology for suspected IBD who presents to the emergency department C/O abdominal pain, nausea, diarrhea. Patient reports that she has been struggling with symptoms since July but they became worse over the last few days. She reports everything she eats passes straight through her. She reports increased urine output. She reports the pain is primarily over the right side of her abdomen and radiates up into her chest and down into her right leg without clear relieving or exacerbating factors. Denies any fever, abnormal vaginal bleeding or discharge, other complaints. PCP: Jeovany Story MD    Current Facility-Administered Medications   Medication Dose Route Frequency Provider Last Rate Last Admin    fluticasone propionate (FLONASE) 50 mcg/actuation nasal spray 2 Spray  2 Spray Both Nostrils DAILY Rosa Brownlee MD   2 [place] at 09/30/21 1224     Current Outpatient Medications   Medication Sig Dispense Refill    cephALEXin (Keflex) 500 mg capsule Take 1 Capsule by mouth three (3) times daily for 7 days. 21 Capsule 0    diphenhydramine HCl (BENADRYL ALLERGY PO) Take  by mouth as needed.  LACTOBACILLUS ACIDOPHILUS PO Take  by mouth daily.  acetaminophen (TYLENOL) 325 mg tablet Take 650 mg by mouth daily.  naloxone (Narcan) 4 mg/actuation nasal spray as needed.  dicyclomine (BENTYL) 20 mg tablet Take 20 mg by mouth two (2) times a day.  promethazine (PHENERGAN) 25 mg tablet 1-2 TABS PO PRN      propranolol LA (Inderal LA) 120 mg SR capsule Take 120 mg by mouth daily.       gabapentin (NEURONTIN) 300 mg capsule Take 300 mg by mouth daily.  gabapentin (NEURONTIN) 600 mg tablet Take  by mouth nightly.  methotrexate (RHEUMATREX) 2.5 mg tablet Take 7.5 mg by mouth every other day. Sunday and Wednesday      folic acid (FOLVITE) 1 mg tablet Take  by mouth daily.  carisoprodoL (Soma) 350 mg tablet Take 1 Tab by mouth every eight (8) hours as needed for Muscle Spasm(s). Max Daily Amount: 1,050 mg. 20 Tab 0       Past History     Past Medical History:  Past Medical History:   Diagnosis Date    Fibromyalgia     Lupus ([address]     Migraines     Polyarthropathy     Psoriasis        Past Surgical History:  Past Surgical History:   Procedure Laterality Date    HX CHOLECYSTECTOMY         Family History:  History reviewed. No pertinent family history. Social History:  Social History     Tobacco Use    Smoking status: Former Smoker    Smokeless tobacco: Never Used   Vaping Use    Vaping Use: Every day    Substances: THC   Substance Use Topics    Alcohol use: Not Currently    Drug use: Yes     Types: Marijuana       Allergies: Allergies   Allergen Reactions    Latex Rash    Chlorhexidine Gluconate Anaphylaxis    Acrylic Acid Itching     Acrylic glue    Clindamycin Rash         Review of Systems   Review of Systems   Constitutional: Negative for fever. Cardiovascular: Positive for chest pain. Gastrointestinal: Positive for abdominal pain, diarrhea and nausea. Genitourinary: Negative for decreased urine volume. All other systems reviewed and are negative.         Physical Exam     Vitals:    09/30/21 0839 09/30/21 0923 09/30/21 1030 09/30/21 1100   BP: 124/77  107/70 121/74   Pulse: 73 64 (!) 58 65   Resp: 17 15 11 18   Temp:       SpO2: 100% 100% 100% 99%   Weight:       Height:         Physical Exam    Nursing notes and vital signs reviewed    Constitutional: Non toxic appearing, moderate distress  Head: Normocephalic, Atraumatic  Eyes: EOMI  Neck: Supple  Cardiovascular: Regular rate and rhythm, no murmurs, rubs, or gallops  Chest: Normal work of breathing and chest excursion bilaterally  Lungs: Clear to ausculation bilaterally  Abdomen: Soft, tender over the right side of the abdomen without rebound or guarding, distended  Back: No evidence of trauma or deformity  Extremities: No evidence of trauma or deformity  Skin: Warm and dry, normal cap refill  Neuro: Alert and appropriate  Psychiatric: Tearful and anxious mood and affect      Diagnostic Study Results     Labs -     Recent Results (from the past 12 hour(s))   CBC WITH AUTOMATED DIFF    Collection Time: 09/30/21  8:17 AM   Result Value Ref Range    WBC 6.6 4.6 - 13.2 K/uL    RBC 3.72 (L) 4.20 - 5.30 M/uL    HGB 12.1 12.0 - 16.0 g/dL    HCT 37.9 35.0 - 45.0 %    .9 (H) 78.0 - 100.0 FL    MCH 32.5 24.0 - 34.0 PG    MCHC 31.9 31.0 - 37.0 g/dL    RDW 14.0 11.6 - 14.5 %    PLATELET 042 (H) 395 - 420 K/uL    MPV 9.8 9.2 - 11.8 FL    NEUTROPHILS 68 40 - 73 %    LYMPHOCYTES 21 21 - 52 %    MONOCYTES 8 3 - 10 %    EOSINOPHILS 2 0 - 5 %    BASOPHILS 1 0 - 2 %    ABS. NEUTROPHILS 4.4 1.8 - 8.0 K/UL    ABS. LYMPHOCYTES 1.4 0.9 - 3.6 K/UL    ABS. MONOCYTES 0.5 0.05 - 1.2 K/UL    ABS. EOSINOPHILS 0.1 0.0 - 0.4 K/UL    ABS. BASOPHILS 0.0 0.0 - 0.1 K/UL    DF AUTOMATED     METABOLIC PANEL, COMPREHENSIVE    Collection Time: 09/30/21  8:17 AM   Result Value Ref Range    Sodium 141 136 - 145 mmol/L    Potassium 4.6 3.5 - 5.5 mmol/L    Chloride 107 100 - 111 mmol/L    CO2 31 21 - 32 mmol/L    Anion gap 3 3.0 - 18 mmol/L    Glucose 89 74 - 99 mg/dL    BUN 15 7.0 - 18 MG/DL    Creatinine 0.93 0.6 - 1.3 MG/DL    BUN/Creatinine ratio 16 12 - 20      GFR est AA >60 >60 ml/min/1.73m2    GFR est non-AA >60 >60 ml/min/1.73m2    Calcium 9.0 8.5 - 10.1 MG/DL    Bilirubin, total 0.4 0.2 - 1.0 MG/DL    ALT (SGPT) 25 13 - 56 U/L    AST (SGOT) 19 10 - 38 U/L    Alk.  phosphatase 106 45 - 117 U/L    Protein, total 7.7 6.4 - 8.2 g/dL    Albumin 3.8 3.4 - 5.0 g/dL    Globulin 3.9 2.0 - 4.0 g/dL    A-G Ratio 1.0 0.8 - 1.7     LIPASE    Collection Time: 09/30/21  8:17 AM   Result Value Ref Range    Lipase 188 73 - 393 U/L   HCG QL SERUM    Collection Time: 09/30/21  8:17 AM   Result Value Ref Range    HCG, Ql. Negative NEG     URINALYSIS W/ RFLX MICROSCOPIC    Collection Time: 09/30/21  8:30 AM   Result Value Ref Range    Color YELLOW      Appearance CLEAR      Specific gravity 1.011 1.005 - 1.030      pH (UA) >8.5 (H) 5.0 - 8.0    Protein Negative NEG mg/dL    Glucose Negative NEG mg/dL    Ketone Negative NEG mg/dL    Bilirubin Negative NEG      Blood Negative NEG      Urobilinogen 0.2 0.2 - 1.0 EU/dL    Nitrites Negative NEG      Leukocyte Esterase MODERATE (A) NEG     URINE MICROSCOPIC ONLY    Collection Time: 09/30/21  8:30 AM   Result Value Ref Range    WBC 4 to 10 0 - 5 /hpf    RBC 0 to 3 0 - 5 /hpf    Epithelial cells 1+ 0 - 5 /lpf    Bacteria FEW (A) NEG /hpf       Radiologic Studies -   CT ABD PELV W CONT   Final Result      1. No acute or inflammatory abnormalities. 2. Multiple hepatic cysts and hemangiomas which do not require follow-up   imaging. 3. Incidental left-sided Bartholin's cyst.                    CT Results  (Last 48 hours)               09/30/21 1012  CT ABD PELV W CONT Final result    Impression:      1. No acute or inflammatory abnormalities. 2. Multiple hepatic cysts and hemangiomas which do not require follow-up   imaging. 3. Incidental left-sided Bartholin's cyst.                       Narrative:  EXAM: CT of the abdomen and pelvis       CLINICAL INDICATION/HISTORY: Abdominal pain. COMPARISON: None. TECHNIQUE: Axial CT imaging of the abdomen and pelvis was performed with   intravenous contrast. Multiplanar reformats were generated. One or more dose   reduction techniques were used on this CT: automated exposure control,   adjustment of the mAs and/or kVp according to patient size, and iterative   reconstruction techniques.   The specific techniques used on this CT exam have   been documented in the patient's electronic medical record. Digital Imaging and   Communications in Medicine (DICOM) format image data are available to   nonaffiliated external healthcare facilities or entities on a secured, media   free, reciprocally searchable basis with patient authorization for at least a   12-month period after this study. _______________       FINDINGS:       LOWER CHEST: Unremarkable. LIVER, BILIARY: Multiple hepatic cysts and hemangiomas identified. No suspicious   liver lesions. No biliary dilation. Cholecystectomy. PANCREAS: Normal.       SPLEEN: Normal.       ADRENALS: Normal.       KIDNEYS/URETERS/BLADDER: Normal.       PELVIC ORGANS: Intrauterine device is in place and in good position. Left-sided   Bartholin's cyst noted. LYMPH NODES: No enlarged lymph nodes. GASTROINTESTINAL TRACT: No bowel dilation or wall thickening. The appendix is   normal.       VASCULATURE: Unremarkable. BONES: No acute or aggressive osseous abnormalities identified. OTHER: Small amount of free fluid.       _______________               CXR Results  (Last 48 hours)    None          Medications given in the ED-  Medications   fluticasone propionate (FLONASE) 50 mcg/actuation nasal spray 2 Spray (2 Sprays Both Nostrils Given 9/30/21 0943)   sodium chloride 0.9 % bolus infusion 1,000 mL (1,000 mL IntraVENous New Bag 9/30/21 0831)   ondansetron (ZOFRAN) injection 4 mg (4 mg IntraVENous Given 9/30/21 0831)   famotidine (PF) (PEPCID) injection 20 mg (20 mg IntraVENous Given 9/30/21 0832)   morphine 10 mg/mL injection 6 mg (6 mg IntraVENous Given 9/30/21 0831)   iopamidoL (ISOVUE 300) 61 % contrast injection 100 mL (100 mL IntraVENous Given 9/30/21 0957)   morphine injection 4 mg (4 mg IntraVENous Given 9/30/21 1057)         Medical Decision Making   I am the first provider for this patient.     I reviewed the vital signs, available nursing notes, past medical history, past surgical history, family history and social history. Vital Signs-Reviewed the patient's vital signs. Pulse Oximetry Analysis - 99% on room air, not hypoxic     Records Reviewed: Nursing Notes    Provider Notes (Medical Decision Making): Ruthanna Opitz is a 42 y.o. female presenting for acute on chronic abdominal pain. CT without acute process. Labs benign other than UTI. Urine culture has been sent. Will treat with appropriate antibiotics. Patient is scheduled for follow-up with a gastroenterologist.  Plan for discharge with return precautions and follow-up with gastroenterology. Patient understands agrees this plan. .    Procedures:  Procedures    ED Course:   11:53 AM  Updated patient on all results and plan. All questions answered. Diagnosis and Disposition     Critical Care: None    DISCHARGE NOTE:    Peola Sensing  results have been reviewed with her. She has been counseled regarding her diagnosis, treatment, and plan. She verbally conveys understanding and agreement of the signs, symptoms, diagnosis, treatment and prognosis and additionally agrees to follow up as discussed. She also agrees with the care-plan and conveys that all of her questions have been answered. I have also provided discharge instructions for her that include: educational information regarding their diagnosis and treatment, and list of reasons why they would want to return to the ED prior to their follow-up appointment, should her condition change. She has been provided with education for proper emergency department utilization. CLINICAL IMPRESSION:    1. Abdominal pain, generalized    2. Acute cystitis without hematuria        PLAN:  1. D/C Home  2. Current Discharge Medication List      START taking these medications    Details   cephALEXin (Keflex) 500 mg capsule Take 1 Capsule by mouth three (3) times daily for 7 days.   Qty: 21 Capsule, Refills: 0  Start date: 9/30/2021, End date: 10/7/2021           3. Follow-up Information     Follow up With Specialties Details Why Contact Info    Alma Dean MD Family Medicine Schedule an appointment as soon as possible for a visit   [facility] [address]  [phone]      Cathleen Becerra MD Gastroenterology Schedule an appointment as soon as possible for a visit  Or Your Gastroenerologist [address] [id number] [phone]      [facility] EMERGENCY DEPT Emergency Medicine  If symptoms worsen [address] [address]  [phone]        _______________________________      Please note that this dictation was completed with [employer], the computer voice recognition software. Quite often unanticipated grammatical, syntax, homophones, and other interpretive errors are inadvertently transcribed by the computer software. Please disregard these errors. Please excuse any errors that have escaped final proofreading.

## 2021-10-01 LAB
BACTERIA SPEC CULT: NORMAL
SERVICE CMNT-IMP: NORMAL

## 2021-10-20 ENCOUNTER — HOSPITAL ENCOUNTER (EMERGENCY)
Age: 41
Discharge: HOME | End: 2021-10-20
Attending: EMERGENCY MEDICINE
Payer: COMMERCIAL

## 2021-10-20 ENCOUNTER — APPOINTMENT (OUTPATIENT)
Dept: ULTRASOUND IMAGING | Age: 41
End: 2021-10-20
Attending: EMERGENCY MEDICINE
Payer: COMMERCIAL

## 2021-10-20 VITALS
TEMPERATURE: 98.6 F | RESPIRATION RATE: 18 BRPM | HEART RATE: 88 BPM | SYSTOLIC BLOOD PRESSURE: 120 MMHG | DIASTOLIC BLOOD PRESSURE: 74 MMHG | OXYGEN SATURATION: 100 %

## 2021-10-20 DIAGNOSIS — R10.2 PELVIC PAIN: Primary | ICD-10-CM

## 2021-10-20 LAB
ALBUMIN SERPL-MCNC: 4.1 G/DL (ref 3.4–5)
ALBUMIN/GLOB SERPL: 1.2 {RATIO} (ref 0.8–1.7)
ALP SERPL-CCNC: 95 U/L (ref 45–117)
ALT SERPL-CCNC: 22 U/L (ref 13–56)
ANION GAP SERPL CALC-SCNC: 3 MMOL/L (ref 3–18)
APPEARANCE UR: CLEAR
AST SERPL-CCNC: 13 U/L (ref 10–38)
BASOPHILS # BLD: 0.1 K/UL (ref 0–0.1)
BASOPHILS NFR BLD: 1 % (ref 0–2)
BILIRUB SERPL-MCNC: 0.3 MG/DL (ref 0.2–1)
BILIRUB UR QL: NEGATIVE
BUN SERPL-MCNC: 12 MG/DL (ref 7–18)
BUN/CREAT SERPL: 13 (ref 12–20)
CALCIUM SERPL-MCNC: 9.3 MG/DL (ref 8.5–10.1)
CHLORIDE SERPL-SCNC: 108 MMOL/L (ref 100–111)
CO2 SERPL-SCNC: 27 MMOL/L (ref 21–32)
COLOR UR: YELLOW
CREAT SERPL-MCNC: 0.92 MG/DL (ref 0.6–1.3)
DIFFERENTIAL METHOD BLD: ABNORMAL
EOSINOPHIL # BLD: 0.1 K/UL (ref 0–0.4)
EOSINOPHIL NFR BLD: 2 % (ref 0–5)
EPITH CASTS URNS QL MICRO: ABNORMAL /LPF (ref 0–5)
ERYTHROCYTE [DISTWIDTH] IN BLOOD BY AUTOMATED COUNT: 13.4 % (ref 11.6–14.5)
GLOBULIN SER CALC-MCNC: 3.3 G/DL (ref 2–4)
GLUCOSE SERPL-MCNC: 75 MG/DL (ref 74–99)
GLUCOSE UR STRIP.AUTO-MCNC: NEGATIVE MG/DL
HCT VFR BLD AUTO: 37 % (ref 35–45)
HGB BLD-MCNC: 12.3 G/DL (ref 12–16)
HGB UR QL STRIP: ABNORMAL
KETONES UR QL STRIP.AUTO: 15 MG/DL
LEUKOCYTE ESTERASE UR QL STRIP.AUTO: ABNORMAL
LYMPHOCYTES # BLD: 2.1 K/UL (ref 0.9–3.6)
LYMPHOCYTES NFR BLD: 24 % (ref 21–52)
MCH RBC QN AUTO: 33.8 PG (ref 24–34)
MCHC RBC AUTO-ENTMCNC: 33.2 G/DL (ref 31–37)
MCV RBC AUTO: 101.6 FL (ref 78–100)
MONOCYTES # BLD: 0.8 K/UL (ref 0.05–1.2)
MONOCYTES NFR BLD: 9 % (ref 3–10)
NEUTS SEG # BLD: 5.9 K/UL (ref 1.8–8)
NEUTS SEG NFR BLD: 65 % (ref 40–73)
NITRITE UR QL STRIP.AUTO: NEGATIVE
PH UR STRIP: 6.5 [PH] (ref 5–8)
PLATELET # BLD AUTO: 372 K/UL (ref 135–420)
PMV BLD AUTO: 9.6 FL (ref 9.2–11.8)
POTASSIUM SERPL-SCNC: 4.3 MMOL/L (ref 3.5–5.5)
PROT SERPL-MCNC: 7.4 G/DL (ref 6.4–8.2)
PROT UR STRIP-MCNC: NEGATIVE MG/DL
RBC # BLD AUTO: 3.64 M/UL (ref 4.2–5.3)
RBC #/AREA URNS HPF: ABNORMAL /HPF (ref 0–5)
SODIUM SERPL-SCNC: 138 MMOL/L (ref 136–145)
SP GR UR REFRACTOMETRY: 1.01 (ref 1–1.03)
UROBILINOGEN UR QL STRIP.AUTO: 0.2 EU/DL (ref 0.2–1)
WBC # BLD AUTO: 9.1 K/UL (ref 4.6–13.2)
WBC URNS QL MICRO: ABNORMAL /HPF (ref 0–5)
YEAST URNS QL MICRO: ABNORMAL

## 2021-10-20 PROCEDURE — 96374 THER/PROPH/DIAG INJ IV PUSH: CPT

## 2021-10-20 PROCEDURE — 76830 TRANSVAGINAL US NON-OB: CPT

## 2021-10-20 PROCEDURE — 96375 TX/PRO/DX INJ NEW DRUG ADDON: CPT

## 2021-10-20 PROCEDURE — 80053 COMPREHEN METABOLIC PANEL: CPT

## 2021-10-20 PROCEDURE — 81001 URINALYSIS AUTO W/SCOPE: CPT

## 2021-10-20 PROCEDURE — 99283 EMERGENCY DEPT VISIT LOW MDM: CPT

## 2021-10-20 PROCEDURE — 74011250636 HC RX REV CODE- 250/636: Performed by: EMERGENCY MEDICINE

## 2021-10-20 PROCEDURE — 85025 COMPLETE CBC W/AUTO DIFF WBC: CPT

## 2021-10-20 RX ORDER — PROCHLORPERAZINE EDISYLATE 5 MG/ML
10 INJECTION INTRAMUSCULAR; INTRAVENOUS
Status: COMPLETED | OUTPATIENT
Start: 2021-10-20 | End: 2021-10-20

## 2021-10-20 RX ORDER — ONDANSETRON 2 MG/ML
4 INJECTION INTRAMUSCULAR; INTRAVENOUS
Status: COMPLETED | OUTPATIENT
Start: 2021-10-20 | End: 2021-10-20

## 2021-10-20 RX ORDER — KETOROLAC TROMETHAMINE 30 MG/ML
15 INJECTION, SOLUTION INTRAMUSCULAR; INTRAVENOUS
Status: COMPLETED | OUTPATIENT
Start: 2021-10-20 | End: 2021-10-20

## 2021-10-20 RX ORDER — HYOSCYAMINE SULFATE 0.12 MG/1
0.12 TABLET SUBLINGUAL
Qty: 20 TABLET | Refills: 0 | Status: SHIPPED | OUTPATIENT
Start: 2021-10-20

## 2021-10-20 RX ADMIN — ONDANSETRON 4 MG: 2 INJECTION INTRAMUSCULAR; INTRAVENOUS at 19:01

## 2021-10-20 RX ADMIN — PROCHLORPERAZINE EDISYLATE 10 MG: 5 INJECTION INTRAMUSCULAR; INTRAVENOUS at 19:50

## 2021-10-20 RX ADMIN — KETOROLAC TROMETHAMINE 15 MG: 30 INJECTION, SOLUTION INTRAMUSCULAR at 19:02

## 2021-10-20 NOTE — ED PROVIDER NOTES
EMERGENCY DEPARTMENT HISTORY AND PHYSICAL EXAM      Date: 10/20/2021  Patient Name: Nagi Cm    Please note that this dictation was completed with [employer], the computer voice recognition software. Quite often unanticipated grammatical, syntax, homophones, and other interpretive errors are inadvertently transcribed by the computer software. Please disregard these errors. Please excuse any errors that have escaped final proofreading. History of Presenting Illness     Chief Complaint   Patient presents with    Abdominal Pain     abd pain with nausea. OBGYN told pt via telephone to come to ED to check IUD       History Provided By: Patient     HPI: Nagi Cm, 42 y.o. female, presenting the emergency department complaining of abdominal pain. She states is been going on since July. Progressively worsening. Its associated with nausea, but no vomiting. She reports pain with intercourse, she denies any pain with penetration, but reports pain that develops after about 10 minutes of intercourse. Patient denies any fever or chills. Denies any vomiting, admits to nausea. Patient also reports that she has fibromyalgia and has diffuse chronic pain. She has had CT scan for this which did not show any acute process. She has had colonoscopy and endoscopy which were all unremarkable. She has not followed up with her OB/GYN doctor. She was concerned that her IUD may be dislodged. PCP: Pradeep Cary MD    No current facility-administered medications on file prior to encounter. Current Outpatient Medications on File Prior to Encounter   Medication Sig Dispense Refill    diphenhydramine HCl (BENADRYL ALLERGY PO) Take  by mouth as needed.  LACTOBACILLUS ACIDOPHILUS PO Take  by mouth daily. (Patient not taking: Reported on 10/20/2021)      acetaminophen (TYLENOL) 325 mg tablet Take 650 mg by mouth daily.  naloxone (Narcan) 4 mg/actuation nasal spray as needed.       dicyclomine (BENTYL) 20 mg tablet Take 20 mg by mouth two (2) times a day.  promethazine (PHENERGAN) 25 mg tablet 1-2 TABS PO PRN      propranolol LA (Inderal LA) 120 mg SR capsule Take 120 mg by mouth daily.  gabapentin (NEURONTIN) 300 mg capsule Take 300 mg by mouth daily.  gabapentin (NEURONTIN) 600 mg tablet Take  by mouth nightly.  methotrexate (RHEUMATREX) 2.5 mg tablet Take 7.5 mg by mouth every other day. Sunday and Wednesday      folic acid (FOLVITE) 1 mg tablet Take  by mouth daily.  carisoprodoL (Soma) 350 mg tablet Take 1 Tab by mouth every eight (8) hours as needed for Muscle Spasm(s). Max Daily Amount: 1,050 mg. (Patient not taking: Reported on 10/20/2021) 20 Tab 0       Past History     Past Medical History:  Past Medical History:   Diagnosis Date    Fibromyalgia     Lupus ([address]     Migraines     Polyarthropathy     Psoriasis        Past Surgical History:  Past Surgical History:   Procedure Laterality Date    HX CHOLECYSTECTOMY         Family History:  History reviewed. No pertinent family history. Social History:  Social History     Tobacco Use    Smoking status: Former Smoker    Smokeless tobacco: Never Used   Vaping Use    Vaping Use: Every day    Substances: THC   Substance Use Topics    Alcohol use: Not Currently    Drug use: Yes     Types: Marijuana       Allergies: Allergies   Allergen Reactions    Latex Rash    Chlorhexidine Gluconate Anaphylaxis    Acrylic Acid Itching     Acrylic glue    Clindamycin Rash         Review of Systems   Review of Systems   Constitutional: Negative for chills and fever. HENT: Negative for congestion and sore throat. Eyes: Negative for visual disturbance. Respiratory: Negative for cough and shortness of breath. Cardiovascular: Negative for chest pain and leg swelling. Gastrointestinal: Positive for abdominal pain and nausea. Negative for blood in stool, diarrhea and vomiting. Endocrine: Negative for polyuria.    Genitourinary: Positive for pelvic pain. Negative for dysuria, flank pain, vaginal bleeding and vaginal discharge. Musculoskeletal: Positive for arthralgias and myalgias. Skin: Negative for rash. Allergic/Immunologic: Negative for immunocompromised state. Neurological: Negative for weakness and headaches. Psychiatric/Behavioral: Negative for confusion. Physical Exam   Physical Exam  Vitals and nursing note reviewed. Constitutional:       Appearance: She is well-developed. HENT:      Head: Normocephalic and atraumatic. Eyes:      General:         Right eye: No discharge. Left eye: No discharge. Conjunctiva/sclera: Conjunctivae normal.      Pupils: Pupils are equal, round, and reactive to light. Neck:      Trachea: No tracheal deviation. Cardiovascular:      Rate and Rhythm: Normal rate and regular rhythm. Heart sounds: Normal heart sounds. No murmur heard. Pulmonary:      Effort: Pulmonary effort is normal. No respiratory distress. Breath sounds: Normal breath sounds. No wheezing or rales. Abdominal:      General: Bowel sounds are normal.      Palpations: Abdomen is soft. Tenderness: There is abdominal tenderness in the right lower quadrant and suprapubic area. There is no guarding or rebound. Musculoskeletal:         General: No tenderness or deformity. Normal range of motion. Cervical back: Normal range of motion and neck supple. Skin:     General: Skin is warm and dry. Findings: No erythema or rash. Neurological:      Mental Status: She is alert and oriented to person, place, and time.    Psychiatric:         Behavior: Behavior normal.         Diagnostic Study Results     Labs -     Recent Results (from the past 12 hour(s))   CBC WITH AUTOMATED DIFF    Collection Time: 10/20/21  6:47 PM   Result Value Ref Range    WBC 9.1 4.6 - 13.2 K/uL    RBC 3.64 (L) 4.20 - 5.30 M/uL    HGB 12.3 12.0 - 16.0 g/dL    HCT 37.0 35.0 - 45.0 %    .6 (H) 78.0 - 100.0 FL    MCH 33.8 24.0 - 34.0 PG    MCHC 33.2 31.0 - 37.0 g/dL    RDW 13.4 11.6 - 14.5 %    PLATELET 889 606 - 251 K/uL    MPV 9.6 9.2 - 11.8 FL    NEUTROPHILS 65 40 - 73 %    LYMPHOCYTES 24 21 - 52 %    MONOCYTES 9 3 - 10 %    EOSINOPHILS 2 0 - 5 %    BASOPHILS 1 0 - 2 %    ABS. NEUTROPHILS 5.9 1.8 - 8.0 K/UL    ABS. LYMPHOCYTES 2.1 0.9 - 3.6 K/UL    ABS. MONOCYTES 0.8 0.05 - 1.2 K/UL    ABS. EOSINOPHILS 0.1 0.0 - 0.4 K/UL    ABS. BASOPHILS 0.1 0.0 - 0.1 K/UL    DF AUTOMATED     METABOLIC PANEL, COMPREHENSIVE    Collection Time: 10/20/21  6:47 PM   Result Value Ref Range    Sodium 138 136 - 145 mmol/L    Potassium 4.3 3.5 - 5.5 mmol/L    Chloride 108 100 - 111 mmol/L    CO2 27 21 - 32 mmol/L    Anion gap 3 3.0 - 18 mmol/L    Glucose 75 74 - 99 mg/dL    BUN 12 7.0 - 18 MG/DL    Creatinine 0.92 0.6 - 1.3 MG/DL    BUN/Creatinine ratio 13 12 - 20      GFR est AA >60 >60 ml/min/1.73m2    GFR est non-AA >60 >60 ml/min/1.73m2    Calcium 9.3 8.5 - 10.1 MG/DL    Bilirubin, total 0.3 0.2 - 1.0 MG/DL    ALT (SGPT) 22 13 - 56 U/L    AST (SGOT) 13 10 - 38 U/L    Alk.  phosphatase 95 45 - 117 U/L    Protein, total 7.4 6.4 - 8.2 g/dL    Albumin 4.1 3.4 - 5.0 g/dL    Globulin 3.3 2.0 - 4.0 g/dL    A-G Ratio 1.2 0.8 - 1.7     URINALYSIS W/ RFLX MICROSCOPIC    Collection Time: 10/20/21  7:01 PM   Result Value Ref Range    Color YELLOW      Appearance CLEAR      Specific gravity 1.009 1.005 - 1.030      pH (UA) 6.5 5.0 - 8.0      Protein Negative NEG mg/dL    Glucose Negative NEG mg/dL    Ketone 15 (A) NEG mg/dL    Bilirubin Negative NEG      Blood SMALL (A) NEG      Urobilinogen 0.2 0.2 - 1.0 EU/dL    Nitrites Negative NEG      Leukocyte Esterase SMALL (A) NEG     URINE MICROSCOPIC ONLY    Collection Time: 10/20/21  7:01 PM   Result Value Ref Range    WBC 10 to 15 0 - 5 /hpf    RBC 5 to 10 0 - 5 /hpf    Epithelial cells 2+ 0 - 5 /lpf    Yeast FEW (A) NEG         Radiologic Studies -   US TRANSVAGINAL W DOPPLER   Final Result      No torsion of the right ovary. Left ovary is not visualized. IUD in the uterus however the tip of the IUD is in the endometrium along the   body of the uterus, approximately 1.6 cm from the uppermost portion the   endometrium. Advise GYN consultation . CT Results  (Last 48 hours)    None        CXR Results  (Last 48 hours)    None            Medical Decision Making   I am the first provider for this patient. I reviewed the vital signs, available nursing notes, past medical history, past surgical history, family history and social history. Vital Signs-Reviewed the patient's vital signs. Patient Vitals for the past 12 hrs:   Temp Pulse Resp BP SpO2   10/20/21 1823 -- -- 18 -- --   10/20/21 1822 98.6 °F (37 °C) 88 -- 120/74 100 %           Records Reviewed:   Nursing notes, Prior visits     No acute process in the abdomenPrior CT from July reviewed, no acute process in the abdomen    Provider Notes (Medical Decision Making):   Patient here with chronic abdominal pain chronic pelvic pain. She looks well and nontoxic. Vital signs reassuring. Differential diagnosis includes ovarian abscess, ovarian torsion, endometriosis, fibroids. Doubt pregnancy, doubt ectopic, doubt appendicitis. Will obtain pelvic ultrasound. If this is unremarkable patient can be safely discharged follow-up with her OB/GYN doctor    ED Course:   Initial assessment performed. The patients presenting problems have been discussed, and they are in agreement with the care plan formulated and outlined with them. I have encouraged them to ask questions as they arise throughout their visit. Bedside ultrasound shows an IUD in place in the uterus. No free fluid. No other acute process in the lower abdomen identified. Ultrasound shows IUD growing in the endometrium. Recommended [facility] consult. Patient hemodynamically stable, she has follow-up with OB/GYN on Tuesday. No need for GYN consult in the ED.   They can follow her in the office. Advised to come back with any change or worsening symptoms. Discussed with the results of imaging with the patient. She is in agreement with plan of care      Critical Care Time:   none    Disposition:    DISCHARGE NOTE  Patients results have been reviewed with them. Patient and/or family have verbally conveyed their understanding and agreement of the patient's signs, symptoms, diagnosis, treatment and prognosis and additionally agree to follow up as recommended or return to the Emergency Room should their condition change or have any new concerns prior to their follow-up appointment. Patient verbally agrees with the care-plan and verbally conveys that all of their questions have been answered. Discharge instructions have also been provided to the patient with some educational information regarding their diagnosis as well a list of reasons why they would want to return to the ER prior to their follow-up appointment should their condition change. PLAN:  1. Current Discharge Medication List      START taking these medications    Details   hyoscyamine SL (LEVSIN/SL) 0.125 mg SL tablet 1 Tablet by SubLINGual route every six (6) hours as needed for Cramping. Qty: 20 Tablet, Refills: 0  Start date: 10/20/2021           2. Follow-up Information     Follow up With Specialties Details Why Contact Info    Your OB/GYN  Schedule an appointment as soon as possible for a visit       [facility] EMERGENCY DEPT Emergency Medicine  If symptoms worsen [address] [address] [phone]          Return to ED if worse     Diagnosis     Clinical Impression:   1. Pelvic pain        Attestations:   This note was completed by Brielle Bradshaw DO

## 2022-12-28 ENCOUNTER — HOSPITAL ENCOUNTER (OUTPATIENT)
Dept: LAB | Age: 42
Discharge: HOME | End: 2022-12-28

## 2022-12-28 ENCOUNTER — OFFICE VISIT (OUTPATIENT)
Dept: HEMATOLOGY | Age: 42
End: 2022-12-28
Payer: COMMERCIAL

## 2022-12-28 VITALS
DIASTOLIC BLOOD PRESSURE: 56 MMHG | BODY MASS INDEX: 18.99 KG/M2 | HEIGHT: 67 IN | SYSTOLIC BLOOD PRESSURE: 97 MMHG | HEART RATE: 75 BPM | TEMPERATURE: 97.7 F | OXYGEN SATURATION: 97 % | WEIGHT: 121 LBS

## 2022-12-28 DIAGNOSIS — R76.8 HEPATITIS C ANTIBODY POSITIVE IN BLOOD: Primary | ICD-10-CM

## 2022-12-28 PROBLEM — K52.9 IBD (INFLAMMATORY BOWEL DISEASE): Status: ACTIVE | Noted: 2022-12-28

## 2022-12-28 PROBLEM — Z90.49 HISTORY OF CHOLECYSTECTOMY: Status: ACTIVE | Noted: 2022-12-28

## 2022-12-28 PROBLEM — L40.9 PSORIASIS: Status: ACTIVE | Noted: 2022-12-28

## 2022-12-28 PROBLEM — M79.7 FIBROMYALGIA: Status: ACTIVE | Noted: 2022-12-28

## 2022-12-28 PROBLEM — G43.909 MIGRAINES: Status: ACTIVE | Noted: 2022-12-28

## 2022-12-28 PROBLEM — M13.0 POLYARTHROPATHY: Status: ACTIVE | Noted: 2022-12-28

## 2022-12-28 LAB — SENTARA SPECIMEN COL,SENBCF: NORMAL

## 2022-12-28 PROCEDURE — 99204 OFFICE O/P NEW MOD 45 MIN: CPT | Performed by: NURSE PRACTITIONER

## 2022-12-28 PROCEDURE — 99001 SPECIMEN HANDLING PT-LAB: CPT

## 2022-12-28 RX ORDER — CETIRIZINE HYDROCHLORIDE 10 MG/1
10 TABLET ORAL DAILY
COMMUNITY

## 2022-12-28 NOTE — PROGRESS NOTES
[address] [facility], MD, [address], [place], [place]      JANNETTE Solis, [facility]-BC   Trudy Puente, [facility]   Stephen Max, JUDITH-GABBIE Ordonezlee Lewis, FNP-C   Tello Castellon, [facility]      [address]   at [address], [facility] Yon Sanders  22.   [phone]   FAX: [address]   at [address], [address]   [phone]   FAX: [phone]       Patient Care Team:  Kirk Pace MD as PCP - General (Family Medicine)    Problem List  Date Reviewed: 12/28/2022            Codes Class Noted    Polyarthropathy ICD-10-CM: M13.0  ICD-9-CM: 716.50  12/28/2022        Psoriasis ICD-10-CM: L40.9  ICD-9-CM: 696.1  12/28/2022        Fibromyalgia ICD-10-CM: M79.7  ICD-9-CM: 729.1  12/28/2022        IBD (inflammatory bowel disease) ICD-10-CM: K52.9  ICD-9-CM: 558.9  12/28/2022        History of cholecystectomy ICD-10-CM: Z90.49  ICD-9-CM: V45.79  12/28/2022        Migraines ICD-10-CM: E00.136  ICD-9-CM: 346.90  12/28/2022         The clinicians listed above have asked me to see Pillo Saucedo in consultation regarding chronic HCV and its management. All medical records sent by the referring physicians were reviewed     The patient is a 36 y.o. female who was screened for anti-HCV and tested positive in 11/2022    Risk factors for acquiring HCV are inhaling cocaine in 2000. There was no history of an episode of acute icteric hepatitis at the time of these risk factors. Imaging of the liver was either not performed or the results are not available to me. An assessment of liver fibrosis with biopsy or elastography has not been performed. The patient has never received treatment for chronic HCV.       The patient has the following symptoms which could be attributed to the liver disorder:  pain in the right side over the liver    The patient is not experiencing the following symptoms which are commonly seen in this liver disorder: fatigue, yellowing of the eyes or skin, itching    The patient has Moderate - Severe limitations in functional activities which can be attributed to to other medical problems that are not related to the liver disease. ASSESSMENT AND PLAN:  Chronic HCV   Chronic HCV of unclear severity. Will perform laboratory testing to monitor liver function and degree of liver injury. This included   BMP, hepatic panel, CBC with platelet count. Liver transaminases are normal. ALP is normal. Liver function is normal.  The platelet count is normal.      Based upon laboratory studies the patient does not appear to have significant liver injury. Will perform and/or review results of HCV viral load, HCV genotype to define the specific treatment and duration of treatment that will be required. Will perform serologic and virologic studies to assess for other causes of chronic liver disease. Will perform imaging of the liver with ultrasound. The need to perform an assessment of liver fibrosis was discussed with the patient. The Fibroscan can assess liver fibrosis and determine if a patient has advanced fibrosis or cirrhosis without the need for liver biopsy. This will be performed at the next office visit. If the Fibroscan suggests advanced fibrosis then a liver biopsy should be considered. The Fibroscan can be repeated annually or as often as clinically indicated to assess for fibrosis progression and/or regression. Chronic HCV Treatment  The patient has not been treated for HCV. The patient has HCV genotype that is not yet defined. Discussed the treatment alternatives. The SVR/cure rate for HCV now exceeds 97% without significant side effects for most patients with HCV.   The specific treatment is dependent upon genotype, viral load and histology. The patient should be treated with Mavyret (glecaprevir and piprentasvir) or Epclusa (sofosbuvir and velpatasvir). The SVR/cure rate for is over 95%. Screening for Hepatocellular Carcinoma  HCC screening is not necessary if the patient has no evidence of cirrhosis. Treatment of other medical problems in patients with chronic liver disease  There are no contraindications for the patient to take most medications that are necessary for treatment of other medical issues. Normal doses of acetaminophen, as recommended on the label of the bottle, are not hepatotoxic except in the setting of daily alcohol use, even in patients with cirrhosis and can be utilized for pain. Counseling for alcohol in patients with chronic liver disease  The patient was counseled regarding alcohol consumption and the effect of alcohol on chronic liver disease. The patient does not consume any significant amount of alcohol. Vaccinations   Vaccination for viral hepatitis A and B is not needed. The patient has serologic evidence of prior exposure or vaccination with immunity. The patient has received 2 doses of COVID-19 vaccine. Routine vaccinations against other bacterial and viral agents can be performed as indicated. Annual flu vaccination should be administered if indicated. ALLERGIES  Allergies   Allergen Reactions    Latex Rash    Chlorhexidine Gluconate Anaphylaxis    Acrylic Acid Itching     Acrylic glue    Band Aid [Adhesive] Other (comments)    Clindamycin Rash       MEDICATIONS  Current Outpatient Medications   Medication Sig    cetirizine (ZYRTEC) 10 mg tablet Take 10 mg by mouth daily. hyoscyamine SL (LEVSIN/SL) 0.125 mg SL tablet 1 Tablet by SubLINGual route every six (6) hours as needed for Cramping. diphenhydramine HCl (BENADRYL ALLERGY PO) Take  by mouth as needed.     acetaminophen (TYLENOL) 325 mg tablet Take 650 mg by mouth daily. dicyclomine (BENTYL) 20 mg tablet Take 20 mg by mouth two (2) times a day. promethazine (PHENERGAN) 25 mg tablet 1-2 TABS PO PRN    propranolol LA (INDERAL LA) 120 mg SR capsule Take 120 mg by mouth daily. gabapentin (NEURONTIN) 300 mg capsule Take 300 mg by mouth daily. gabapentin (NEURONTIN) 600 mg tablet Take  by mouth nightly. methotrexate (RHEUMATREX) 2.5 mg tablet Take 7.5 mg by mouth every other day. Sunday and Wednesday    folic acid (FOLVITE) 1 mg tablet Take  by mouth daily. No current facility-administered medications for this visit. SYSTEM REVIEW NOT RELATED TO LIVER DISEASE OR REVIEWED ABOVE:  Constitution systems: Negative for fever, chills, weight gain, weight loss. Eyes: Negative for visual changes. ENT: Negative for sore throat, painful swallowing. Respiratory: Negative for cough, hemoptysis, SOB. Cardiology: Negative for chest pain, palpitations. GI:  Negative for constipation or diarrhea. : Negative for urinary frequency, dysuria, hematuria, nocturia. Skin: Negative for rash. Hematology: Negative for easy bruising, blood clots. Musculo-skelatal: Negative for back pain, muscle pain, weakness. Neurologic: Negative for headaches, dizziness, vertigo, memory problems not related to HE. Psychology: Negative for anxiety, depression. FAMILY HISTORY:  The father  Has/had the following following chronic disease(s): lupus, UC,   The mother Has/had the following chronic disease(s): COPD, Afib,   There is no family history of liver disease. The following family members have immune disorders: Sister: Chron's, RA, Father: Lupus, brother celiac, sister alopecia     SOCIAL HISTORY:  The patient is . The spouse has been tested for HCV and is negative. The patient has no children. The patient has never used tobacco products. The patient has never consumed significant amounts of alcohol.     The patient currently works full time dental assistant. PHYSICAL EXAMINATION:  Visit Vitals  BP (!) 97/56   Pulse 75   Temp 97.7 °F (36.5 °C)   Ht 5' 7\" (1.702 m)   Wt 121 lb (54.9 kg)   SpO2 97%   BMI 18.95 kg/m²     General: No acute distress. Eyes: Sclera anicteric. ENT: No oral lesions. Thyroid normal.  Nodes: No adenopathy. Skin: No spider angiomata. No jaundice. No palmar erythema. Respiratory: Lungs clear to auscultation. Cardiovascular: Regular heart rate. No murmurs. No JVD. Abdomen: Soft non-tender. Liver size normal to percussion/palpation. Spleen not palpable. No obvious ascites. Extremities: No edema. No muscle wasting. No gross arthritic changes. Neurologic: Alert and oriented. Cranial nerves grossly intact. No asterixis. LABORATORY STUDIES:  From 11/2022  AST/ALT/ALP/T Bili/ALB: 15/12/89/0.6/4.8    SEROLOGIES:  11/2022. HBsAntigen negative, anti-HBcore negative, anti-HBsurface positive, anti-HCV positive,     LIVER HISTOLOGY:  Not available or performed    ENDOSCOPIC PROCEDURES:  Not available or performed    RADIOLOGY:  Not available or performed    OTHER TESTING:  Not available or performed    FOLLOW-UP:  All of the issues listed above in the Assessment and Plan were discussed with the patient. All questions were answered. The patient expressed a clear understanding of the above. [address] [place] [address] in 4 weeks for Fibroscan and to review all data and determine the treatment plan.       Vista Oppenheim, AGPCNP-BC  [address] [place] [address]  [facility], [address] [place] [place] [address] Street [address]  [phone]

## 2022-12-29 LAB
ABSOLUTE LYMPHOCYTE COUNT, 10803: 0.9 K/UL (ref 1–4.8)
BASOPHILS # BLD: 0 K/UL (ref 0–0.2)
BASOPHILS NFR BLD: 1 % (ref 0–2)
EOSINOPHIL # BLD: 0 K/UL (ref 0–0.5)
EOSINOPHIL NFR BLD: 1 % (ref 0–6)
ERYTHROCYTE [DISTWIDTH] IN BLOOD BY AUTOMATED COUNT: 14.2 % (ref 10–15.5)
GRANULOCYTES,GRANS: 79 % (ref 40–75)
HCT VFR BLD AUTO: 43.4 % (ref 35.1–46.5)
HCV AB SER IA-ACNC: NORMAL
HGB BLD-MCNC: 13.1 G/DL (ref 11.7–15.5)
LYMPHOCYTES, LYMLT: 14 % (ref 20–45)
MCH RBC QN AUTO: 33 PG (ref 26–34)
MCHC RBC AUTO-ENTMCNC: 30 G/DL (ref 31–36)
MCV RBC AUTO: 110 FL (ref 80–99)
MONOCYTES # BLD: 0.4 K/UL (ref 0.1–1)
MONOCYTES NFR BLD: 6 % (ref 3–12)
NEUTROPHILS # BLD AUTO: 5.2 K/UL (ref 1.8–7.7)
PLATELET # BLD AUTO: 356 K/UL (ref 140–440)
PMV BLD AUTO: 10.5 FL (ref 9–13)
RBC # BLD AUTO: 3.95 M/UL (ref 3.8–5.2)
WBC # BLD AUTO: 6.6 K/UL (ref 4–11)

## 2022-12-31 LAB — HEP A AB, TOTAL, 006726: POSITIVE

## 2023-01-13 ENCOUNTER — HOSPITAL ENCOUNTER (OUTPATIENT)
Dept: ULTRASOUND IMAGING | Age: 43
Discharge: HOME | End: 2023-01-13
Attending: NURSE PRACTITIONER
Payer: COMMERCIAL

## 2023-01-13 DIAGNOSIS — R76.8 HEPATITIS C ANTIBODY POSITIVE IN BLOOD: ICD-10-CM

## 2023-01-13 PROCEDURE — 76700 US EXAM ABDOM COMPLETE: CPT

## 2023-02-16 ENCOUNTER — HOSPITAL ENCOUNTER (OUTPATIENT)
Facility: HOSPITAL | Age: 43
Setting detail: SPECIMEN
Discharge: HOME | End: 2023-02-19

## 2023-02-16 ENCOUNTER — OFFICE VISIT (OUTPATIENT)
Age: 43
End: 2023-02-16
Payer: COMMERCIAL

## 2023-02-16 VITALS
WEIGHT: 117.38 LBS | DIASTOLIC BLOOD PRESSURE: 60 MMHG | SYSTOLIC BLOOD PRESSURE: 100 MMHG | BODY MASS INDEX: 18.38 KG/M2 | HEART RATE: 63 BPM | OXYGEN SATURATION: 98 % | TEMPERATURE: 98 F

## 2023-02-16 DIAGNOSIS — Z78.9 HEPATITIS C ANTIBODY TEST NEGATIVE: Primary | ICD-10-CM

## 2023-02-16 LAB — SENTARA SPECIMEN COLLECTION: NORMAL

## 2023-02-16 PROCEDURE — 91200 LIVER ELASTOGRAPHY: CPT | Performed by: NURSE PRACTITIONER

## 2023-02-16 PROCEDURE — 99001 SPECIMEN HANDLING PT-LAB: CPT

## 2023-02-16 PROCEDURE — 99213 OFFICE O/P EST LOW 20 MIN: CPT | Performed by: NURSE PRACTITIONER

## 2023-02-16 RX ORDER — LOPERAMIDE HYDROCHLORIDE 2 MG/1
2 CAPSULE ORAL 4 TIMES DAILY PRN
COMMUNITY

## 2023-02-16 NOTE — PROGRESS NOTES
[address] [facility], MD, [address], [place], [place]      PAOLO Sol S Deisi, AGPCNP-BC   Gulshan Hunt, ACNPC-AG   Misty Harris, FNPVanessaC  Cam Vizcarra, FNP-C   Maggie Diejuany, AGPCNP-BC      [address]   at [address], [address]  22.   [phone]   FAX: [phone]   at [address], [address] [place] [address], [address] [place] [address] Street [address]   [phone]   FAX: [phone]       Patient Care Team:  Zuleika Abreu MD as PCP - General (Family Medicine)    Problem List  Date Reviewed: 12/28/2022            Codes Class Noted    Polyarthropathy ICD-10-CM: M13.0  ICD-9-CM: 716.50  12/28/2022        Psoriasis ICD-10-CM: L40.9  ICD-9-CM: 696.1  12/28/2022        Fibromyalgia ICD-10-CM: M79.7  ICD-9-CM: 729.1  12/28/2022        IBD (inflammatory bowel disease) ICD-10-CM: K52.9  ICD-9-CM: 558.9  12/28/2022        History of cholecystectomy ICD-10-CM: Z90.49  ICD-9-CM: V45.79  12/28/2022        Migraines ICD-10-CM: X83.886  ICD-9-CM: 346.90  12/28/2022         Cam Vizcarra is being seen at [address] for management of positive anti HCV in blood. The active problem list, all pertinent past medical history, medications, liver histology, radiologic findings, and laboratory findings related to the liver disorder were reviewed and discussed with the patient. The patient is a 36 y.o. female who was screened for anti-HCV and tested positive in 11/2022    Risk factors for acquiring HCV are inhaling cocaine in 2000. There was no history of an episode of acute icteric hepatitis at the time of these risk factors. The most recent imaging of the liver was Ultrasound performed in 1/2023. Results suggest that the liver is normal. No liver mass lesions noted.     Assessment of liver fibrosis with Fibroscan was performed in the office today. The result was 4.3 kPa which correlates with no fibrosis. The CAP score of 242 does not suggest hepatic steatosis. The patient has never received treatment for chronic HCV. The patient has the following symptoms which could be attributed to the liver disorder:  pain in the right side over the liver    The patient is not experiencing the following symptoms which are commonly seen in this liver disorder: fatigue, yellowing of the eyes or skin, itching    The patient has Moderate - Severe limitations in functional activities which can be attributed to other medical problems that are not related to the liver disease. ASSESSMENT AND PLAN:  ANTI-HCV negative. There are 2 negative anti HCV and HCV RNA tests. No further testing for HCV is required. Have performed laboratory testing to monitor liver function and degree of liver injury. This included   BMP, hepatic panel, CBC with platelet count. Liver transaminases are normal. ALP is normal. Liver function is normal.  The platelet count is normal.      Based upon laboratory studies, imaging, and Fibroscan the patient does not appear to have significant liver injury. Serologic and virologic studies to assess for other causes of chronic liver disease were negative. The need to perform an assessment of liver fibrosis was discussed with the patient. The Fibroscan can assess liver fibrosis and determine if a patient has advanced fibrosis or cirrhosis without the need for liver biopsy. Screening for Hepatocellular Carcinoma  HCC screening is not necessary if the patient has no evidence of cirrhosis. Treatment of other medical problems in patients with chronic liver disease  There are no contraindications for the patient to take most medications that are necessary for treatment of other medical issues.     Normal doses of acetaminophen, as recommended on the label of the bottle, are not hepatotoxic except in the setting of daily alcohol use, even in patients with cirrhosis and can be utilized for pain. Counseling for alcohol in patients with chronic liver disease  The patient was counseled regarding alcohol consumption and the effect of alcohol on chronic liver disease. The patient does not consume any significant amount of alcohol. Vaccinations   Vaccination for viral hepatitis A and B is not needed. The patient has serologic evidence of prior exposure or vaccination with immunity. The patient has received 2 doses of COVID-19 vaccine. Routine vaccinations against other bacterial and viral agents can be performed as indicated. Annual flu vaccination should be administered if indicated. ALLERGIES  Allergies   Allergen Reactions    Latex Rash    Chlorhexidine Gluconate Anaphylaxis    Acrylic Acid Itching     Acrylic glue    Band Aid [Adhesive] Other (comments)    Clindamycin Rash       MEDICATIONS  Current Outpatient Medications   Medication Sig    cetirizine (ZYRTEC) 10 mg tablet Take 10 mg by mouth daily. hyoscyamine SL (LEVSIN/SL) 0.125 mg SL tablet 1 Tablet by SubLINGual route every six (6) hours as needed for Cramping. diphenhydramine HCl (BENADRYL ALLERGY PO) Take  by mouth as needed. acetaminophen (TYLENOL) 325 mg tablet Take 650 mg by mouth daily. dicyclomine (BENTYL) 20 mg tablet Take 20 mg by mouth two (2) times a day. promethazine (PHENERGAN) 25 mg tablet 1-2 TABS PO PRN    propranolol LA (INDERAL LA) 120 mg SR capsule Take 120 mg by mouth daily. gabapentin (NEURONTIN) 300 mg capsule Take 300 mg by mouth daily. gabapentin (NEURONTIN) 600 mg tablet Take  by mouth nightly. methotrexate (RHEUMATREX) 2.5 mg tablet Take 7.5 mg by mouth every other day. Sunday and Wednesday    folic acid (FOLVITE) 1 mg tablet Take  by mouth daily. No current facility-administered medications for this visit.        SYSTEM REVIEW NOT RELATED TO LIVER DISEASE OR REVIEWED ABOVE:  Constitution systems: Negative for fever, chills, weight gain, weight loss. Eyes: Negative for visual changes. ENT: Negative for sore throat, painful swallowing. Respiratory: Negative for cough, hemoptysis, SOB. Cardiology: Negative for chest pain, palpitations. GI:  Negative for constipation or diarrhea. : Negative for urinary frequency, dysuria, hematuria, nocturia. Skin: Negative for rash. Hematology: Negative for easy bruising, blood clots. Musculo-skelatal: Negative for back pain, muscle pain, weakness. Neurologic: Negative for headaches, dizziness, vertigo, memory problems not related to HE. Psychology: Negative for anxiety, depression. FAMILY HISTORY:  The father  Has/had the following following chronic disease(s): lupus, UC,   The mother Has/had the following chronic disease(s): COPD, Afib,   There is no family history of liver disease. The following family members have immune disorders: Sister: Chron's, RA, Father: Lupus, brother celiac, sister alopecia     SOCIAL HISTORY:  The patient is . The spouse has been tested for HCV and is negative. The patient has no children. The patient has never used tobacco products. The patient has never consumed significant amounts of alcohol. The patient currently works full time dental assistant. PHYSICAL EXAMINATION:  Visit Vitals  BP (!) 97/56   Pulse 75   Temp 97.7 °F (36.5 °C)   Ht 5' 7\" (1.702 m)   Wt 121 lb (54.9 kg)   SpO2 97%   BMI 18.95 kg/m²     General: No acute distress. Eyes: Sclera anicteric. ENT: No oral lesions. Thyroid normal.  Nodes: No adenopathy. Skin: No spider angiomata. No jaundice. No palmar erythema. Respiratory: Lungs clear to auscultation. Cardiovascular: Regular heart rate. No murmurs. No JVD. Abdomen: Soft non-tender. Liver size normal to percussion/palpation. Spleen not palpable. No obvious ascites. Extremities: No edema. No muscle wasting.   No gross arthritic changes. Neurologic: Alert and oriented. Cranial nerves grossly intact. No asterixis. LABORATORY STUDIES:  From 11/2022  AST/ALT/ALP/T Bili/ALB: 15/12/89/0.6/4.8    SEROLOGIES:  11/2022. HBsAntigen negative, anti-HBcore negative, anti-HBsurface positive, anti-HCV positive    12/2022. HAV total positive, anti-HCV negative, HCV RNA not detected    Serologies Latest Ref Rng & Units 2/16/2023   Hep C Ab None Detected None Detected   Hep BE AB None Detected None Detected       LIVER HISTOLOGY:  2/2023. FibroScan performed at [address]. EkPa was 4.3. IQR/med 14%. . The results suggested a fibrosis level of F0. The CAP score suggests there is no significant hepatic steatosis. ENDOSCOPIC PROCEDURES:  Not available or performed    RADIOLOGY:  1/2023. Ultrasound of liver. Normal appearing liver. No liver mass lesions. OTHER TESTING:  Not available or performed    FOLLOW-UP:  All of the issues listed above in the Assessment and Plan were discussed with the patient. All questions were answered. The patient expressed a clear understanding of the above. No follow-up at [address] is needed. I would be glad to see the patient back for follow-up at any time in the future if the clinical situation changes.         HARVINDER Olsen  . Kimberly Cantu [facility] [place] of [address], [facility] [address]  [place], [address] [place] [address] - [address]  [phone]

## 2023-02-17 LAB — HEPATITIS C ANTIBODY: NORMAL
